# Patient Record
Sex: MALE | Race: WHITE | Employment: OTHER | URBAN - METROPOLITAN AREA
[De-identification: names, ages, dates, MRNs, and addresses within clinical notes are randomized per-mention and may not be internally consistent; named-entity substitution may affect disease eponyms.]

---

## 2019-09-25 ENCOUNTER — HOSPITAL ENCOUNTER (OUTPATIENT)
Dept: RADIATION THERAPY | Age: 69
Discharge: HOME | End: 2019-09-25

## 2022-01-01 ENCOUNTER — HOSPICE ADMISSION (OUTPATIENT)
Dept: HOSPICE | Facility: HOSPICE | Age: 72
End: 2022-01-01
Payer: MEDICARE

## 2022-01-01 ENCOUNTER — APPOINTMENT (OUTPATIENT)
Dept: GENERAL RADIOLOGY | Age: 72
DRG: 811 | End: 2022-01-01
Attending: EMERGENCY MEDICINE
Payer: OTHER MISCELLANEOUS

## 2022-01-01 ENCOUNTER — HOSPITAL ENCOUNTER (INPATIENT)
Age: 72
LOS: 3 days | Discharge: HOSPICE | DRG: 811 | End: 2022-01-06
Attending: EMERGENCY MEDICINE | Admitting: INTERNAL MEDICINE
Payer: OTHER MISCELLANEOUS

## 2022-01-01 ENCOUNTER — APPOINTMENT (OUTPATIENT)
Dept: VASCULAR SURGERY | Age: 72
DRG: 811 | End: 2022-01-01
Attending: EMERGENCY MEDICINE
Payer: OTHER MISCELLANEOUS

## 2022-01-01 ENCOUNTER — HOSPITAL ENCOUNTER (INPATIENT)
Age: 72
LOS: 1 days | DRG: 951 | End: 2022-01-06
Attending: FAMILY MEDICINE | Admitting: FAMILY MEDICINE
Payer: OTHER MISCELLANEOUS

## 2022-01-01 ENCOUNTER — APPOINTMENT (OUTPATIENT)
Dept: CT IMAGING | Age: 72
DRG: 811 | End: 2022-01-01
Attending: EMERGENCY MEDICINE
Payer: OTHER MISCELLANEOUS

## 2022-01-01 VITALS
TEMPERATURE: 98.2 F | HEART RATE: 102 BPM | HEIGHT: 71 IN | SYSTOLIC BLOOD PRESSURE: 83 MMHG | DIASTOLIC BLOOD PRESSURE: 53 MMHG | WEIGHT: 130 LBS | BODY MASS INDEX: 18.2 KG/M2 | RESPIRATION RATE: 46 BRPM | OXYGEN SATURATION: 82 %

## 2022-01-01 DIAGNOSIS — Z71.89 DNR (DO NOT RESUSCITATE) DISCUSSION: ICD-10-CM

## 2022-01-01 DIAGNOSIS — E87.6 HYPOKALEMIA: ICD-10-CM

## 2022-01-01 DIAGNOSIS — F05 DELIRIUM DUE TO GENERAL MEDICAL CONDITION: ICD-10-CM

## 2022-01-01 DIAGNOSIS — D64.9 ANEMIA, UNSPECIFIED TYPE: ICD-10-CM

## 2022-01-01 DIAGNOSIS — Z71.1 CONCERN ABOUT END OF LIFE: ICD-10-CM

## 2022-01-01 DIAGNOSIS — Z71.89 ADVANCED CARE PLANNING/COUNSELING DISCUSSION: ICD-10-CM

## 2022-01-01 DIAGNOSIS — R06.4 LABORED BREATHING: ICD-10-CM

## 2022-01-01 DIAGNOSIS — R62.7 FAILURE TO THRIVE IN ADULT: ICD-10-CM

## 2022-01-01 DIAGNOSIS — C61 PROSTATE CANCER METASTATIC TO BONE (HCC): ICD-10-CM

## 2022-01-01 DIAGNOSIS — Z51.5 END OF LIFE CARE: ICD-10-CM

## 2022-01-01 DIAGNOSIS — R41.0 DELIRIUM: Primary | ICD-10-CM

## 2022-01-01 DIAGNOSIS — R41.82 ALTERED MENTAL STATUS, UNSPECIFIED ALTERED MENTAL STATUS TYPE: ICD-10-CM

## 2022-01-01 DIAGNOSIS — C79.51 PROSTATE CANCER METASTATIC TO BONE (HCC): ICD-10-CM

## 2022-01-01 DIAGNOSIS — Z51.5 PALLIATIVE CARE ENCOUNTER: ICD-10-CM

## 2022-01-01 DIAGNOSIS — Z71.89 GOALS OF CARE, COUNSELING/DISCUSSION: ICD-10-CM

## 2022-01-01 LAB
ABO + RH BLD: NORMAL
ALBUMIN SERPL-MCNC: 2 G/DL (ref 3.5–5)
ALBUMIN/GLOB SERPL: 0.5 {RATIO} (ref 1.1–2.2)
ALP SERPL-CCNC: 735 U/L (ref 45–117)
ALT SERPL-CCNC: 18 U/L (ref 12–78)
AMPHET UR QL SCN: NEGATIVE
ANION GAP SERPL CALC-SCNC: 4 MMOL/L (ref 5–15)
ANION GAP SERPL CALC-SCNC: 7 MMOL/L (ref 5–15)
APPEARANCE UR: CLEAR
AST SERPL-CCNC: 48 U/L (ref 15–37)
BACTERIA URNS QL MICRO: NEGATIVE /HPF
BARBITURATES UR QL SCN: NEGATIVE
BASOPHILS # BLD: 0.1 K/UL (ref 0–0.1)
BASOPHILS NFR BLD: 2 % (ref 0–1)
BENZODIAZ UR QL: NEGATIVE
BILIRUB SERPL-MCNC: 0.7 MG/DL (ref 0.2–1)
BILIRUB UR QL CFM: NEGATIVE
BLD PROD TYP BPU: NORMAL
BLOOD GROUP ANTIBODIES SERPL: NORMAL
BPU ID: NORMAL
BUN SERPL-MCNC: 13 MG/DL (ref 6–20)
BUN SERPL-MCNC: 15 MG/DL (ref 6–20)
BUN/CREAT SERPL: 19 (ref 12–20)
BUN/CREAT SERPL: 19 (ref 12–20)
CALCIUM SERPL-MCNC: 7.3 MG/DL (ref 8.5–10.1)
CALCIUM SERPL-MCNC: 7.8 MG/DL (ref 8.5–10.1)
CANNABINOIDS UR QL SCN: NEGATIVE
CHLORIDE SERPL-SCNC: 102 MMOL/L (ref 97–108)
CHLORIDE SERPL-SCNC: 98 MMOL/L (ref 97–108)
CO2 SERPL-SCNC: 37 MMOL/L (ref 21–32)
CO2 SERPL-SCNC: 38 MMOL/L (ref 21–32)
COCAINE UR QL SCN: NEGATIVE
COLOR UR: ABNORMAL
COMMENT, HOLDF: NORMAL
CREAT SERPL-MCNC: 0.68 MG/DL (ref 0.7–1.3)
CREAT SERPL-MCNC: 0.81 MG/DL (ref 0.7–1.3)
CROSSMATCH RESULT,%XM: NORMAL
DIFFERENTIAL METHOD BLD: ABNORMAL
DRUG SCRN COMMENT,DRGCM: ABNORMAL
EOSINOPHIL # BLD: 0 K/UL (ref 0–0.4)
EOSINOPHIL NFR BLD: 0 % (ref 0–7)
EPITH CASTS URNS QL MICRO: ABNORMAL /LPF
ERYTHROCYTE [DISTWIDTH] IN BLOOD BY AUTOMATED COUNT: 22.6 % (ref 11.5–14.5)
ERYTHROCYTE [DISTWIDTH] IN BLOOD BY AUTOMATED COUNT: 25.9 % (ref 11.5–14.5)
FERRITIN SERPL-MCNC: 2471 NG/ML (ref 26–388)
FOLATE SERPL-MCNC: 4.5 NG/ML (ref 5–21)
GLOBULIN SER CALC-MCNC: 4.2 G/DL (ref 2–4)
GLUCOSE SERPL-MCNC: 110 MG/DL (ref 65–100)
GLUCOSE SERPL-MCNC: 99 MG/DL (ref 65–100)
GLUCOSE UR STRIP.AUTO-MCNC: NEGATIVE MG/DL
HCT VFR BLD AUTO: 16.2 % (ref 36.6–50.3)
HCT VFR BLD AUTO: 18.8 % (ref 36.6–50.3)
HCT VFR BLD AUTO: 19.2 % (ref 36.6–50.3)
HCT VFR BLD AUTO: 21.1 % (ref 36.6–50.3)
HCT VFR BLD AUTO: 25.5 % (ref 36.6–50.3)
HEMOCCULT STL QL: NEGATIVE
HGB BLD-MCNC: 4.6 G/DL (ref 12.1–17)
HGB BLD-MCNC: 5.6 G/DL (ref 12.1–17)
HGB BLD-MCNC: 5.7 G/DL (ref 12.1–17)
HGB BLD-MCNC: 6.2 G/DL (ref 12.1–17)
HGB BLD-MCNC: 7.8 G/DL (ref 12.1–17)
HGB UR QL STRIP: ABNORMAL
HISTORY CHECKED?,CKHIST: NORMAL
HYALINE CASTS URNS QL MICRO: ABNORMAL /LPF (ref 0–5)
IMM GRANULOCYTES # BLD AUTO: 0 K/UL
IMM GRANULOCYTES NFR BLD AUTO: 0 %
IRON SATN MFR SERPL: 97 % (ref 20–50)
IRON SERPL-MCNC: 106 UG/DL (ref 35–150)
KETONES UR QL STRIP.AUTO: NEGATIVE MG/DL
LACTATE SERPL-SCNC: 2 MMOL/L (ref 0.4–2)
LEUKOCYTE ESTERASE UR QL STRIP.AUTO: NEGATIVE
LIPASE SERPL-CCNC: 29 U/L (ref 73–393)
LYMPHOCYTES # BLD: 0.8 K/UL (ref 0.8–3.5)
LYMPHOCYTES NFR BLD: 15 % (ref 12–49)
MCH RBC QN AUTO: 26.3 PG (ref 26–34)
MCH RBC QN AUTO: 26.9 PG (ref 26–34)
MCHC RBC AUTO-ENTMCNC: 28.4 G/DL (ref 30–36.5)
MCHC RBC AUTO-ENTMCNC: 29.8 G/DL (ref 30–36.5)
MCV RBC AUTO: 90.4 FL (ref 80–99)
MCV RBC AUTO: 92.6 FL (ref 80–99)
METAMYELOCYTES NFR BLD MANUAL: 1 %
METHADONE UR QL: NEGATIVE
MONOCYTES # BLD: 0.3 K/UL (ref 0–1)
MONOCYTES NFR BLD: 5 % (ref 5–13)
MYELOCYTES NFR BLD MANUAL: 4 %
NEUTS BAND NFR BLD MANUAL: 2 % (ref 0–6)
NEUTS SEG # BLD: 3.7 K/UL (ref 1.8–8)
NEUTS SEG NFR BLD: 71 % (ref 32–75)
NITRITE UR QL STRIP.AUTO: NEGATIVE
NRBC # BLD: 0.09 K/UL (ref 0–0.01)
NRBC # BLD: 0.12 K/UL (ref 0–0.01)
NRBC BLD-RTO: 1.9 PER 100 WBC
NRBC BLD-RTO: 2.4 PER 100 WBC
OPIATES UR QL: POSITIVE
PATH REV BLD -IMP: NORMAL
PCP UR QL: NEGATIVE
PH UR STRIP: 7 [PH] (ref 5–8)
PLATELET # BLD AUTO: 227 K/UL (ref 150–400)
PLATELET # BLD AUTO: 261 K/UL (ref 150–400)
PMV BLD AUTO: 9.1 FL (ref 8.9–12.9)
PMV BLD AUTO: 9.4 FL (ref 8.9–12.9)
POTASSIUM SERPL-SCNC: 2.5 MMOL/L (ref 3.5–5.1)
POTASSIUM SERPL-SCNC: 2.7 MMOL/L (ref 3.5–5.1)
PROT SERPL-MCNC: 6.2 G/DL (ref 6.4–8.2)
PROT UR STRIP-MCNC: ABNORMAL MG/DL
RBC # BLD AUTO: 1.75 M/UL (ref 4.1–5.7)
RBC # BLD AUTO: 2.08 M/UL (ref 4.1–5.7)
RBC #/AREA URNS HPF: ABNORMAL /HPF (ref 0–5)
RBC MORPH BLD: ABNORMAL
RETICS # AUTO: 0.08 M/UL (ref 0.03–0.1)
RETICS/RBC NFR AUTO: 3.6 % (ref 0.7–2.1)
SAMPLES BEING HELD,HOLD: NORMAL
SODIUM SERPL-SCNC: 140 MMOL/L (ref 136–145)
SODIUM SERPL-SCNC: 146 MMOL/L (ref 136–145)
SP GR UR REFRACTOMETRY: 1.02 (ref 1–1.03)
SPECIMEN EXP DATE BLD: NORMAL
STATUS OF UNIT,%ST: NORMAL
TIBC SERPL-MCNC: 109 UG/DL (ref 250–450)
TROPONIN-HIGH SENSITIVITY: 20 NG/L (ref 0–76)
UNIT DIVISION, %UDIV: 0
UR CULT HOLD, URHOLD: NORMAL
UROBILINOGEN UR QL STRIP.AUTO: 1 EU/DL (ref 0.2–1)
VIT B12 SERPL-MCNC: 476 PG/ML (ref 193–986)
WBC # BLD AUTO: 4.7 K/UL (ref 4.1–11.1)
WBC # BLD AUTO: 5 K/UL (ref 4.1–11.1)
WBC URNS QL MICRO: ABNORMAL /HPF (ref 0–4)

## 2022-01-01 PROCEDURE — 83690 ASSAY OF LIPASE: CPT

## 2022-01-01 PROCEDURE — 83540 ASSAY OF IRON: CPT

## 2022-01-01 PROCEDURE — 36430 TRANSFUSION BLD/BLD COMPNT: CPT

## 2022-01-01 PROCEDURE — 87040 BLOOD CULTURE FOR BACTERIA: CPT

## 2022-01-01 PROCEDURE — 86900 BLOOD TYPING SEROLOGIC ABO: CPT

## 2022-01-01 PROCEDURE — 96365 THER/PROPH/DIAG IV INF INIT: CPT

## 2022-01-01 PROCEDURE — 3336500001 HSPC ELECTION

## 2022-01-01 PROCEDURE — P9016 RBC LEUKOCYTES REDUCED: HCPCS

## 2022-01-01 PROCEDURE — 74011250637 HC RX REV CODE- 250/637: Performed by: INTERNAL MEDICINE

## 2022-01-01 PROCEDURE — 94761 N-INVAS EAR/PLS OXIMETRY MLT: CPT

## 2022-01-01 PROCEDURE — 81001 URINALYSIS AUTO W/SCOPE: CPT

## 2022-01-01 PROCEDURE — 85045 AUTOMATED RETICULOCYTE COUNT: CPT

## 2022-01-01 PROCEDURE — 82746 ASSAY OF FOLIC ACID SERUM: CPT

## 2022-01-01 PROCEDURE — 74011250636 HC RX REV CODE- 250/636: Performed by: INTERNAL MEDICINE

## 2022-01-01 PROCEDURE — 85025 COMPLETE CBC W/AUTO DIFF WBC: CPT

## 2022-01-01 PROCEDURE — 74011250637 HC RX REV CODE- 250/637: Performed by: EMERGENCY MEDICINE

## 2022-01-01 PROCEDURE — 84484 ASSAY OF TROPONIN QUANT: CPT

## 2022-01-01 PROCEDURE — 82272 OCCULT BLD FECES 1-3 TESTS: CPT

## 2022-01-01 PROCEDURE — 74011250636 HC RX REV CODE- 250/636: Performed by: NURSE PRACTITIONER

## 2022-01-01 PROCEDURE — 36415 COLL VENOUS BLD VENIPUNCTURE: CPT

## 2022-01-01 PROCEDURE — 85027 COMPLETE CBC AUTOMATED: CPT

## 2022-01-01 PROCEDURE — 83605 ASSAY OF LACTIC ACID: CPT

## 2022-01-01 PROCEDURE — 93970 EXTREMITY STUDY: CPT

## 2022-01-01 PROCEDURE — 86923 COMPATIBILITY TEST ELECTRIC: CPT

## 2022-01-01 PROCEDURE — 99223 1ST HOSP IP/OBS HIGH 75: CPT | Performed by: NURSE PRACTITIONER

## 2022-01-01 PROCEDURE — 99233 SBSQ HOSP IP/OBS HIGH 50: CPT | Performed by: INTERNAL MEDICINE

## 2022-01-01 PROCEDURE — 77010033678 HC OXYGEN DAILY

## 2022-01-01 PROCEDURE — 0656 HSPC GENERAL INPATIENT

## 2022-01-01 PROCEDURE — 85018 HEMOGLOBIN: CPT

## 2022-01-01 PROCEDURE — 80048 BASIC METABOLIC PNL TOTAL CA: CPT

## 2022-01-01 PROCEDURE — 82728 ASSAY OF FERRITIN: CPT

## 2022-01-01 PROCEDURE — 82607 VITAMIN B-12: CPT

## 2022-01-01 PROCEDURE — 65270000029 HC RM PRIVATE

## 2022-01-01 PROCEDURE — 74011000250 HC RX REV CODE- 250: Performed by: INTERNAL MEDICINE

## 2022-01-01 PROCEDURE — 80307 DRUG TEST PRSMV CHEM ANLYZR: CPT

## 2022-01-01 PROCEDURE — 71045 X-RAY EXAM CHEST 1 VIEW: CPT

## 2022-01-01 PROCEDURE — 65660000000 HC RM CCU STEPDOWN

## 2022-01-01 PROCEDURE — 70450 CT HEAD/BRAIN W/O DYE: CPT

## 2022-01-01 PROCEDURE — 80053 COMPREHEN METABOLIC PANEL: CPT

## 2022-01-01 PROCEDURE — 74011250636 HC RX REV CODE- 250/636: Performed by: EMERGENCY MEDICINE

## 2022-01-01 PROCEDURE — 99285 EMERGENCY DEPT VISIT HI MDM: CPT

## 2022-01-01 PROCEDURE — 74011000250 HC RX REV CODE- 250: Performed by: NURSE PRACTITIONER

## 2022-01-01 RX ORDER — GLYCOPYRROLATE 0.2 MG/ML
0.2 INJECTION INTRAMUSCULAR; INTRAVENOUS
Status: DISCONTINUED | OUTPATIENT
Start: 2022-01-01 | End: 2022-01-01 | Stop reason: HOSPADM

## 2022-01-01 RX ORDER — KETOROLAC TROMETHAMINE 30 MG/ML
30 INJECTION, SOLUTION INTRAMUSCULAR; INTRAVENOUS
Status: DISCONTINUED | OUTPATIENT
Start: 2022-01-01 | End: 2022-01-01 | Stop reason: HOSPADM

## 2022-01-01 RX ORDER — FACIAL-BODY WIPES
10 EACH TOPICAL DAILY PRN
Status: DISCONTINUED | OUTPATIENT
Start: 2022-01-01 | End: 2022-01-01 | Stop reason: HOSPADM

## 2022-01-01 RX ORDER — ONDANSETRON 4 MG/1
4 TABLET, ORALLY DISINTEGRATING ORAL
Status: DISCONTINUED | OUTPATIENT
Start: 2022-01-01 | End: 2022-01-01 | Stop reason: HOSPADM

## 2022-01-01 RX ORDER — HYDROMORPHONE HYDROCHLORIDE 2 MG/ML
3 INJECTION, SOLUTION INTRAMUSCULAR; INTRAVENOUS; SUBCUTANEOUS
Status: DISCONTINUED | OUTPATIENT
Start: 2022-01-01 | End: 2022-01-01 | Stop reason: HOSPADM

## 2022-01-01 RX ORDER — MORPHINE SULFATE 4 MG/ML
4 INJECTION INTRAVENOUS
Status: DISCONTINUED | OUTPATIENT
Start: 2022-01-01 | End: 2022-01-01

## 2022-01-01 RX ORDER — SODIUM CHLORIDE AND POTASSIUM CHLORIDE .9; .15 G/100ML; G/100ML
SOLUTION INTRAVENOUS ONCE
Status: COMPLETED | OUTPATIENT
Start: 2022-01-01 | End: 2022-01-01

## 2022-01-01 RX ORDER — TAMSULOSIN HYDROCHLORIDE 0.4 MG/1
0.4 CAPSULE ORAL DAILY
COMMUNITY

## 2022-01-01 RX ORDER — FOLIC ACID 1 MG/1
1 TABLET ORAL DAILY
Status: DISCONTINUED | OUTPATIENT
Start: 2022-01-01 | End: 2022-01-01

## 2022-01-01 RX ORDER — FUROSEMIDE 20 MG/1
20 TABLET ORAL
COMMUNITY

## 2022-01-01 RX ORDER — SODIUM CHLORIDE 9 MG/ML
250 INJECTION, SOLUTION INTRAVENOUS AS NEEDED
Status: DISPENSED | OUTPATIENT
Start: 2022-01-01 | End: 2022-01-01

## 2022-01-01 RX ORDER — OXYCODONE HYDROCHLORIDE 15 MG/1
30 TABLET, FILM COATED, EXTENDED RELEASE ORAL EVERY 12 HOURS
COMMUNITY

## 2022-01-01 RX ORDER — MIRTAZAPINE 7.5 MG/1
7.5 TABLET, FILM COATED ORAL
COMMUNITY

## 2022-01-01 RX ORDER — CALCIUM CARBONATE/VITAMIN D3 500 MG-10
1 TABLET ORAL 2 TIMES DAILY
COMMUNITY

## 2022-01-01 RX ORDER — LORAZEPAM 2 MG/ML
2 INJECTION INTRAMUSCULAR
Status: DISCONTINUED | OUTPATIENT
Start: 2022-01-01 | End: 2022-01-01 | Stop reason: HOSPADM

## 2022-01-01 RX ORDER — SODIUM CHLORIDE 9 MG/ML
250 INJECTION, SOLUTION INTRAVENOUS AS NEEDED
Status: DISCONTINUED | OUTPATIENT
Start: 2022-01-01 | End: 2022-01-01

## 2022-01-01 RX ORDER — LORAZEPAM 2 MG/ML
1 INJECTION INTRAMUSCULAR
Status: DISCONTINUED | OUTPATIENT
Start: 2022-01-01 | End: 2022-01-01

## 2022-01-01 RX ORDER — OXYCODONE HYDROCHLORIDE 5 MG/1
20 TABLET ORAL
Status: DISCONTINUED | OUTPATIENT
Start: 2022-01-01 | End: 2022-01-01

## 2022-01-01 RX ORDER — SODIUM CHLORIDE 0.9 % (FLUSH) 0.9 %
5-40 SYRINGE (ML) INJECTION EVERY 8 HOURS
Status: DISCONTINUED | OUTPATIENT
Start: 2022-01-01 | End: 2022-01-01

## 2022-01-01 RX ORDER — ACETAMINOPHEN 325 MG/1
650 TABLET ORAL
Status: DISCONTINUED | OUTPATIENT
Start: 2022-01-01 | End: 2022-01-01 | Stop reason: HOSPADM

## 2022-01-01 RX ORDER — POTASSIUM CHLORIDE 750 MG/1
40 TABLET, FILM COATED, EXTENDED RELEASE ORAL
Status: COMPLETED | OUTPATIENT
Start: 2022-01-01 | End: 2022-01-01

## 2022-01-01 RX ORDER — UREA 10 %
100 LOTION (ML) TOPICAL DAILY
COMMUNITY

## 2022-01-01 RX ORDER — HYDROMORPHONE HYDROCHLORIDE 2 MG/ML
2 INJECTION, SOLUTION INTRAMUSCULAR; INTRAVENOUS; SUBCUTANEOUS
Status: DISCONTINUED | OUTPATIENT
Start: 2022-01-01 | End: 2022-01-01

## 2022-01-01 RX ORDER — DULOXETIN HYDROCHLORIDE 30 MG/1
30 CAPSULE, DELAYED RELEASE ORAL DAILY
COMMUNITY

## 2022-01-01 RX ORDER — HYDROMORPHONE HYDROCHLORIDE 2 MG/ML
2 INJECTION, SOLUTION INTRAMUSCULAR; INTRAVENOUS; SUBCUTANEOUS EVERY 4 HOURS
Status: DISCONTINUED | OUTPATIENT
Start: 2022-01-01 | End: 2022-01-01

## 2022-01-01 RX ORDER — NALOXONE HYDROCHLORIDE 4 MG/.1ML
1 SPRAY NASAL
COMMUNITY

## 2022-01-01 RX ORDER — UMECLIDINIUM 62.5 UG/1
1 AEROSOL, POWDER ORAL DAILY
COMMUNITY

## 2022-01-01 RX ORDER — LANOLIN ALCOHOL/MO/W.PET/CERES
325 CREAM (GRAM) TOPICAL
COMMUNITY

## 2022-01-01 RX ORDER — ONDANSETRON 4 MG/1
4 TABLET, ORALLY DISINTEGRATING ORAL
COMMUNITY

## 2022-01-01 RX ORDER — LIDOCAINE 50 MG/G
1 PATCH TOPICAL EVERY 24 HOURS
COMMUNITY

## 2022-01-01 RX ORDER — ONDANSETRON 2 MG/ML
4 INJECTION INTRAMUSCULAR; INTRAVENOUS
Status: DISCONTINUED | OUTPATIENT
Start: 2022-01-01 | End: 2022-01-01 | Stop reason: HOSPADM

## 2022-01-01 RX ORDER — OXYCODONE HYDROCHLORIDE 10 MG/1
20 TABLET ORAL
COMMUNITY

## 2022-01-01 RX ORDER — ALBUTEROL SULFATE 90 UG/1
2 AEROSOL, METERED RESPIRATORY (INHALATION)
COMMUNITY

## 2022-01-01 RX ORDER — OXYCODONE HCL 20 MG/1
20 TABLET, FILM COATED, EXTENDED RELEASE ORAL EVERY 12 HOURS
Status: DISCONTINUED | OUTPATIENT
Start: 2022-01-01 | End: 2022-01-01

## 2022-01-01 RX ORDER — DOCUSATE SODIUM 100 MG/1
200 CAPSULE, LIQUID FILLED ORAL 2 TIMES DAILY
COMMUNITY

## 2022-01-01 RX ORDER — LORAZEPAM 2 MG/ML
1 INJECTION INTRAMUSCULAR EVERY 4 HOURS
Status: DISCONTINUED | OUTPATIENT
Start: 2022-01-01 | End: 2022-01-01

## 2022-01-01 RX ORDER — LEUPROLIDE ACETATE 45 MG
45 KIT INTRAMUSCULAR
COMMUNITY

## 2022-01-01 RX ORDER — POLYETHYLENE GLYCOL 3350 17 G/17G
17 POWDER, FOR SOLUTION ORAL DAILY PRN
Status: DISCONTINUED | OUTPATIENT
Start: 2022-01-01 | End: 2022-01-01

## 2022-01-01 RX ORDER — SENNOSIDES 8.6 MG/1
2 TABLET ORAL
COMMUNITY

## 2022-01-01 RX ORDER — ACETAMINOPHEN 650 MG/1
650 SUPPOSITORY RECTAL
Status: DISCONTINUED | OUTPATIENT
Start: 2022-01-01 | End: 2022-01-01 | Stop reason: HOSPADM

## 2022-01-01 RX ORDER — SODIUM CHLORIDE 0.9 % (FLUSH) 0.9 %
5-40 SYRINGE (ML) INJECTION AS NEEDED
Status: DISCONTINUED | OUTPATIENT
Start: 2022-01-01 | End: 2022-01-01 | Stop reason: HOSPADM

## 2022-01-01 RX ORDER — OXYBUTYNIN CHLORIDE 10 MG/1
10 TABLET, EXTENDED RELEASE ORAL DAILY
COMMUNITY

## 2022-01-01 RX ORDER — MELATONIN
1000 DAILY
COMMUNITY

## 2022-01-01 RX ADMIN — MORPHINE SULFATE 4 MG: 4 INJECTION, SOLUTION INTRAMUSCULAR; INTRAVENOUS at 21:44

## 2022-01-01 RX ADMIN — MORPHINE SULFATE 4 MG: 4 INJECTION, SOLUTION INTRAMUSCULAR; INTRAVENOUS at 22:42

## 2022-01-01 RX ADMIN — LORAZEPAM 1 MG: 2 INJECTION INTRAMUSCULAR; INTRAVENOUS at 23:12

## 2022-01-01 RX ADMIN — HYDROMORPHONE HYDROCHLORIDE 2 MG: 2 INJECTION, SOLUTION INTRAMUSCULAR; INTRAVENOUS; SUBCUTANEOUS at 11:42

## 2022-01-01 RX ADMIN — POTASSIUM CHLORIDE 40 MEQ: 750 TABLET, FILM COATED, EXTENDED RELEASE ORAL at 13:39

## 2022-01-01 RX ADMIN — LORAZEPAM 1 MG: 2 INJECTION INTRAMUSCULAR; INTRAVENOUS at 00:02

## 2022-01-01 RX ADMIN — LORAZEPAM 2 MG: 2 INJECTION INTRAMUSCULAR; INTRAVENOUS at 11:03

## 2022-01-01 RX ADMIN — POTASSIUM CHLORIDE 40 MEQ: 750 TABLET, FILM COATED, EXTENDED RELEASE ORAL at 09:54

## 2022-01-01 RX ADMIN — FOLIC ACID 1 MG: 1 TABLET ORAL at 08:12

## 2022-01-01 RX ADMIN — LORAZEPAM 1 MG: 2 INJECTION INTRAMUSCULAR; INTRAVENOUS at 02:29

## 2022-01-01 RX ADMIN — MORPHINE SULFATE 4 MG: 4 INJECTION, SOLUTION INTRAMUSCULAR; INTRAVENOUS at 04:03

## 2022-01-01 RX ADMIN — POTASSIUM CHLORIDE 40 MEQ: 750 TABLET, FILM COATED, EXTENDED RELEASE ORAL at 08:12

## 2022-01-01 RX ADMIN — MORPHINE SULFATE 4 MG: 4 INJECTION, SOLUTION INTRAMUSCULAR; INTRAVENOUS at 10:07

## 2022-01-01 RX ADMIN — HYDROMORPHONE HYDROCHLORIDE 2 MG: 2 INJECTION INTRAMUSCULAR; INTRAVENOUS; SUBCUTANEOUS at 02:33

## 2022-01-01 RX ADMIN — LORAZEPAM 1 MG: 2 INJECTION INTRAMUSCULAR; INTRAVENOUS at 05:20

## 2022-01-01 RX ADMIN — HYDROMORPHONE HYDROCHLORIDE 2 MG: 2 INJECTION INTRAMUSCULAR; INTRAVENOUS; SUBCUTANEOUS at 23:11

## 2022-01-01 RX ADMIN — LORAZEPAM 1 MG: 2 INJECTION INTRAMUSCULAR; INTRAVENOUS at 15:21

## 2022-01-01 RX ADMIN — GLYCOPYRROLATE 0.2 MG: 0.2 INJECTION, SOLUTION INTRAMUSCULAR; INTRAVENOUS at 19:01

## 2022-01-01 RX ADMIN — MORPHINE SULFATE 4 MG: 4 INJECTION, SOLUTION INTRAMUSCULAR; INTRAVENOUS at 02:48

## 2022-01-01 RX ADMIN — POTASSIUM CHLORIDE AND SODIUM CHLORIDE 500 ML: 900; 150 INJECTION, SOLUTION INTRAVENOUS at 13:42

## 2022-01-01 RX ADMIN — HYDROMORPHONE HYDROCHLORIDE 2 MG: 2 INJECTION, SOLUTION INTRAMUSCULAR; INTRAVENOUS; SUBCUTANEOUS at 15:21

## 2022-01-01 RX ADMIN — HYDROMORPHONE HYDROCHLORIDE 2 MG: 2 INJECTION INTRAMUSCULAR; INTRAVENOUS; SUBCUTANEOUS at 05:20

## 2022-01-01 RX ADMIN — LORAZEPAM 1 MG: 2 INJECTION INTRAMUSCULAR; INTRAVENOUS at 22:18

## 2022-01-01 RX ADMIN — LORAZEPAM 1 MG: 2 INJECTION INTRAMUSCULAR; INTRAVENOUS at 11:42

## 2022-01-01 RX ADMIN — HYDROMORPHONE HYDROCHLORIDE 3 MG: 2 INJECTION, SOLUTION INTRAMUSCULAR; INTRAVENOUS; SUBCUTANEOUS at 11:03

## 2022-01-01 RX ADMIN — HYDROMORPHONE HYDROCHLORIDE 2 MG: 2 INJECTION, SOLUTION INTRAMUSCULAR; INTRAVENOUS; SUBCUTANEOUS at 13:31

## 2022-01-01 RX ADMIN — MORPHINE SULFATE 4 MG: 4 INJECTION, SOLUTION INTRAMUSCULAR; INTRAVENOUS at 01:01

## 2022-01-01 RX ADMIN — MORPHINE SULFATE 4 MG: 4 INJECTION, SOLUTION INTRAMUSCULAR; INTRAVENOUS at 23:33

## 2022-01-01 RX ADMIN — SODIUM CHLORIDE, PRESERVATIVE FREE 10 ML: 5 INJECTION INTRAVENOUS at 13:34

## 2022-01-01 RX ADMIN — LORAZEPAM 1 MG: 2 INJECTION INTRAMUSCULAR; INTRAVENOUS at 08:37

## 2022-01-01 RX ADMIN — LORAZEPAM 1 MG: 2 INJECTION INTRAMUSCULAR; INTRAVENOUS at 05:59

## 2022-01-01 RX ADMIN — ACETAMINOPHEN 650 MG: 325 TABLET ORAL at 20:22

## 2022-01-01 RX ADMIN — HYDROMORPHONE HYDROCHLORIDE 2 MG: 2 INJECTION, SOLUTION INTRAMUSCULAR; INTRAVENOUS; SUBCUTANEOUS at 17:37

## 2022-01-01 RX ADMIN — HYDROMORPHONE HYDROCHLORIDE 2 MG: 2 INJECTION INTRAMUSCULAR; INTRAVENOUS; SUBCUTANEOUS at 08:37

## 2022-01-01 RX ADMIN — HYDROMORPHONE HYDROCHLORIDE 2 MG: 2 INJECTION, SOLUTION INTRAMUSCULAR; INTRAVENOUS; SUBCUTANEOUS at 19:50

## 2022-01-01 RX ADMIN — MORPHINE SULFATE 4 MG: 4 INJECTION, SOLUTION INTRAMUSCULAR; INTRAVENOUS at 05:47

## 2022-01-01 RX ADMIN — MORPHINE SULFATE 4 MG: 4 INJECTION, SOLUTION INTRAMUSCULAR; INTRAVENOUS at 06:42

## 2022-01-01 RX ADMIN — MORPHINE SULFATE 4 MG: 4 INJECTION, SOLUTION INTRAMUSCULAR; INTRAVENOUS at 19:01

## 2022-01-01 RX ADMIN — LORAZEPAM 1 MG: 2 INJECTION INTRAMUSCULAR; INTRAVENOUS at 19:50

## 2022-01-01 RX ADMIN — LORAZEPAM 1 MG: 2 INJECTION INTRAMUSCULAR; INTRAVENOUS at 02:33

## 2022-01-01 RX ADMIN — MORPHINE SULFATE 4 MG: 4 INJECTION, SOLUTION INTRAMUSCULAR; INTRAVENOUS at 02:09

## 2022-01-03 PROBLEM — D64.9 SEVERE ANEMIA: Status: ACTIVE | Noted: 2022-01-01

## 2022-01-03 PROBLEM — E87.6 HYPOKALEMIA: Status: ACTIVE | Noted: 2022-01-01

## 2022-01-03 NOTE — PROGRESS NOTES
[facility]: MED RECONCILIATION    Information obtained from: Patient with AMS so [facility] (11/30/2021 in EPIC); no Rx query on file    Significant PMH/Disease States: No past medical history on file. Chief Complaint for this Admission:   Chief Complaint   Patient presents with    Altered mental status     Allergies: Valium [diazepam]    Prior to Admission Medications:     Medication Documentation Review Audit       Reviewed by Lein Murphy (Pharmacist) on 01/03/22 at 1432      Medication Sig Documenting Provider Last Dose Status Taking? albuterol (PROVENTIL HFA, VENTOLIN HFA, PROAIR HFA) 90 mcg/actuation inhaler Take 2 Puffs by inhalation every six (6) hours as needed for Wheezing. Provider, Historical  Active Yes   Calcium-Cholecalciferol, D3, 500 mg-10 mcg (400 unit) tab Take 1 Tablet by mouth two (2) times a day. Provider, Historical  Active Yes   cholecalciferol (VITAMIN D3) (1000 Units /25 mcg) tablet Take 1,000 Units by mouth daily. Provider, Historical  Active Yes   cyanocobalamin (VITAMIN B12) 100 mcg tablet Take 100 mcg by mouth daily. Provider, Historical  Active Yes   docusate sodium (COLACE) 100 mg capsule Take 200 mg by mouth two (2) times a day. Provider, Historical  Active Yes   DULoxetine (CYMBALTA) 30 mg capsule Take 30 mg by mouth daily. Provider, Historical  Active Yes   enzalutamide (XTANDI) 40 mg capsule Take 160 mg by mouth daily. Provider, Historical  Active Yes   ferrous sulfate 325 mg (65 mg iron) tablet Take  by mouth Daily (before breakfast). Provider, Historical  Active Yes   furosemide (LASIX) 20 mg tablet Take 20 mg by mouth daily as needed (edema/swelling). Provider, Historical  Active Yes   leuprolide depot (Lupron Depot, 6 Month,) 45 mg injection 45 mg by IntraMUSCular route every 6 months.  Provider, Historical  Active Yes           Med Note Marcelina Claude Rudy 3, 2022  2:27 PM) Salima Bonner from [facility] ([phone]   lidocaine (LIDODERM) 5 % 1 Patch by TransDERmal route every twenty-four (24) hours. Apply patch to the affected area for 12 hours a day and remove for 12 hours a day. Provider, Historical  Active Yes   mirtazapine (REMERON) 7.5 mg tablet Take 7.5 mg by mouth nightly. Provider, Historical  Active Yes   naloxone (Narcan) 4 mg/actuation nasal spray 1 [place] by IntraNASal route once as needed for Overdose. Use 1 spray intranasally, then discard. Repeat with new spray every 2 min as needed for opioid overdose symptoms, alternating nostrils. Provider, Historical  Active Yes   ondansetron (ZOFRAN ODT) 4 mg disintegrating tablet Take 4 mg by mouth every eight (8) hours as needed for Nausea or Vomiting. Provider, Historical  Active Yes   oxybutynin chloride XL (DITROPAN XL) 10 mg CR tablet Take 10 mg by mouth daily. Provider, Historical  Active Yes   oxyCODONE ER (OxyCONTIN) 15 mg ER tablet Take 30 mg by mouth every twelve (12) hours. Provider, Historical  Active Yes   oxyCODONE IR (ROXICODONE) 10 mg tab immediate release tablet Take 20 mg by mouth every three (3) hours as needed for Pain (breakthrough pain). Provider, Historical  Active Yes   senna (Senna) 8.6 mg tablet Take 2 Tablets by mouth nightly as needed for Constipation. Provider, Historical  Active Yes   tamsulosin (FLOMAX) 0.4 mg capsule Take 0.4 mg by mouth daily. Provider, Historical  Active Yes   umeclidinium (Incruse Ellipta) 62.5 mcg/actuation inhaler Take 1 Puff by inhalation daily. Provider, Historical  Active Yes                Thank you,  Prosper COSTELLO, [facility]

## 2022-01-03 NOTE — ED PROVIDER NOTES
Aram Cummins is a 72 yo M with h/o prostate cancer who is brought to the ED for altered mental status. EMS reports patient live is private home with cousins. Patient no longer recognizes family members. Has not bathed in several days. Patient states family members. Patient also compalining of swelling in his legs           No past medical history on file. No past surgical history on file. No family history on file. Social History     Socioeconomic History    Marital status: Not on file     Spouse name: Not on file    Number of children: Not on file    Years of education: Not on file    Highest education level: Not on file   Occupational History    Not on file   Tobacco Use    Smoking status: Not on file    Smokeless tobacco: Not on file   Substance and Sexual Activity    Alcohol use: Not on file    Drug use: Not on file    Sexual activity: Not on file   Other Topics Concern    Not on file   Social History Narrative    Not on file     Social Determinants of Health     Financial Resource Strain:     Difficulty of Paying Living Expenses: Not on file   Food Insecurity:     Worried About Running Out of Food in the Last Year: Not on file    Derek of Food in the Last Year: Not on file   Transportation Needs:     Lack of Transportation (Medical): Not on file    Lack of Transportation (Non-Medical):  Not on file   Physical Activity:     Days of Exercise per Week: Not on file    Minutes of Exercise per Session: Not on file   Stress:     Feeling of Stress : Not on file   Social Connections:     Frequency of Communication with Friends and Family: Not on file    Frequency of Social Gatherings with Friends and Family: Not on file    Attends Jew Services: Not on file    Active Member of Clubs or Organizations: Not on file    Attends Club or Organization Meetings: Not on file    Marital Status: Not on file   Intimate Partner Violence:     Fear of Current or Ex-Partner: Not on file  Emotionally Abused: Not on file    Physically Abused: Not on file    Sexually Abused: Not on file   Housing Stability:     Unable to Pay for Housing in the Last Year: Not on file    Number of Places Lived in the Last Year: Not on file    Unstable Housing in the Last Year: Not on file         ALLERGIES: Patient has no allergy information on record. Review of Systems   Unable to perform ROS: Mental status change   Cardiovascular: Positive for leg swelling. Vitals:    01/03/22 1525 01/03/22 1540 01/03/22 1610 01/03/22 1702   BP: (!) 141/61 (!) 146/73 (!) 121/57 135/64   Pulse: 83 79 84 82   Resp: 20 18 18 18   Temp: 98.5 °F (36.9 °C) 98.4 °F (36.9 °C) 98.2 °F (36.8 °C) 98.6 °F (37 °C)   SpO2: 99% 99% 99% 99%   Weight:       Height:                Physical Exam  Vitals and nursing note reviewed. Constitutional:       General: He is not in acute distress. Appearance: He is well-developed. He is cachectic. Comments: Brown smears on feet and body   HENT:      Head: Normocephalic and atraumatic. Eyes:      Conjunctiva/sclera: Conjunctivae normal.   Neck:      Trachea: Phonation normal.   Cardiovascular:      Rate and Rhythm: Normal rate. Pulmonary:      Effort: Pulmonary effort is normal. No respiratory distress. Breath sounds: No wheezing. Abdominal:      General: There is no distension. Tenderness: There is no abdominal tenderness. Musculoskeletal:         General: No tenderness. Normal range of motion. Cervical back: Normal range of motion. Right lower leg: Edema present. Left lower leg: Edema present. Skin:     General: Skin is warm and dry. Neurological:      Mental Status: He is alert. He is not disoriented. Motor: No abnormal muscle tone. MDM       Perfect Serve Consult for Admission  1:28 PM    ED Room Number: ERA/A  Patient Name and age:  Rashi Harper 73 y.o.  male  Working Diagnosis:   1. Delirium    2. Hypokalemia    3.  Anemia, unspecified type    4. Failure to thrive in adult        COVID-19 Suspicion:  no  Sepsis present:  no  Reassessment needed: N/A  Code Status:  Full Code  Readmission: no  Isolation Requirements:  no  Recommended Level of Care:  telemetry  Department:[facility] Adult ED - 21   Other:  Patient with prostate CA, followed at [facility]. Lives a private residence EMS reports caregivers note patient increasingly confused. Does not recognize them.  K 2.5, HGB 4.6. Awaiting urine specimen. 1 unit PRBCs ordered as well as PO and IV potassium.     Procedures

## 2022-01-03 NOTE — H&P
Pieter Snyder [facility]  [address] [facility], [place], [address] [place] [address]  [phone]    Admission History and Physical      NAME:  Gerhardt Eva   :   1950   MRN:  [id number]     PCP:  No primary care provider on file. Date/Time of service:  1/3/2022  2:01 PM        Subjective:     CHIEF COMPLAINT: \"I think someone is poisoning me\"    HISTORY OF PRESENT ILLNESS:     Mr. Mee Murphy is a 73 y.o. male who is admitted with concern for \"someone is trying to poison me. \" States over the past 5-6 days, he has been confused and he himself thought he was developing dementia. 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 Once he passed, his nephew said patient could stay with him due to no heat at patient's house. There is also a woman, Jeannette Hedrick, who lives there too and helps takes care of him, although he states they do not help him. He follows at [facility] for geriatric medicine and prostate cancer with bony mets. Apparently he has been evaluated by palliative care in the past as well per geriatric medicine note 2021 and he was going to consider hospice. On my evaluation, he states he is no longer interested in hospice at this time. In ER, he was noted to have severe anemia with hgb 4.6 and hypokalemia with K 2.5.     Allergies   Allergen Reactions    Valium [Diazepam] Anxiety       Prior to Admission medications    Not on File       Past Medical History: prostate cancer with bone mets, lung adenocarcinoma, COPD, chronic pain     Past Surgical History: bilateral lobectomy, abdominal wall hernia repair    Social History     Tobacco Use    Smoking status: Not on file    Smokeless tobacco: Not on file   Substance Use Topics    Alcohol use: Not on file        Family History: heart disease in mother and father     Review of Systems:  (bold if positive, if negative)    Gen:  Eyes:  ENT:  CVS:  Pulm:  GI:  :  MS:  Skin:  Psych:  Endo:  Hem:  Renal: Neuro:          Objective:      VITALS:    Vital signs reviewed; most recent are:    Visit Vitals  /66   Pulse 84   Temp 97.8 °F (36.6 °C)   Resp 18   Ht 5' 11\" (1.803 m)   Wt 59 kg (130 lb)   SpO2 94%   BMI 18.13 kg/m²     SpO2 Readings from Last 6 Encounters:   01/03/22 94%        No intake or output data in the 24 hours ending 01/03/22 1401     Exam:     Physical Exam:    Gen:  Well-developed, cachectic, in no acute distress  HEENT:  Pink conjunctivae, PERRL, hearing intact to voice, moist mucous membranes  Neck:  Supple, without masses, thyroid non-tender  Resp:  No accessory muscle use, clear breath sounds without wheezes rales or rhonchi  Card:  No murmurs, normal S1, S2 without thrills, bruits; +b/l LE edema  Abd:  Soft, non-tender, non-distended, normoactive bowel sounds are present, no palpable organomegaly and no detectable hernias  Lymph:  No cervical adenopathy  Musc:  No cyanosis or clubbing  Skin:  No rashes or ulcers, skin turgor is good; dirt on R foot, L axilla  Neuro:  Nonfocal, moves extremities, follows commands  Psych:  Alert with good insight. Oriented to person, place, and time    Labs:    Recent Labs     01/03/22  1220   WBC 5.0   HGB 4.6*   HCT 16.2*        Recent Labs     01/03/22  1220      K 2.5*   CL 98   CO2 38*   *   BUN 15   CREA 0.81   CA 7.8*   ALB 2.0*   TBILI 0.7   ALT 18          Assessment/Plan:       1. Anemia - for 1 unit in ER, recheck h/h, transfuse prn hgb<7; stool occult neg, check iron panel, b12, folate  2. Hypokalemia - replace  3. Acute encephalopathy - resolved, check uds   4. Metastatic prostate cancer - mets to bone; follows urology, s/p radiation, c/s palliative care here as he was considering hospice per geriatric medicine note 11/30  5. History of lung adenocarcinoma - s/p b/l lobectomy  6. Chronic pain - chronic narcotic use  7. Anxiety  8. Urinary retention  9.  Underweight - BMI 18.13; c/s nutrition; on remeron at bedtime prn for appetite  10. COPD without acute exacerbation - cont inh regimen  11. VTE prophylaxis - hold pharm anticoag se to anemia w/u  12. Dispo - admit to telemetry, c/s palliative care    Per chart, note 11/30/2021 from [address] [place] [address], completed dDNR but he is adamant to me that he wishes to be kept alive including chest compressions and intubation, if necessary.      Attending Physician: Shreya Finley, DO

## 2022-01-04 NOTE — CONSULTS
Comprehensive Nutrition Assessment    Type and Reason for Visit: Initial,Consult    Nutrition Recommendations/Plan:   1. Continue Easy to [employer]   2. Provide Ensure Enlive TID (1050 kcal, 132 g carbs, 60 g P) to aid in meeting kcal/protein needs. 3. Please obtain measured weight. Nutrition Assessment:    Pt is a 73year old male admitted with Severe anemia; Hypokalemia. He  has no past medical history on file. RD consulted for poor intake/appetite x 5 days. No PO intake documented. Patient states great appetite, consumed 50% of meal at time of RD visit. States #, but stated weight from 1/3/2022 is 130#. Patient endorses recent weight loss, isn't sure how much, but states it is due to 'not being fed where [he] was'. Believes he began losing weight six weeks ago. Patient was unable to explain further If stated weights correct, patient has lost 55# (29%) x 6 weeks- unlikely. Voiced acceptance of Ensure Enlive TID. NKFA. No N/V/D at this time. Wears top dentures. Wt Readings from Last 10 Encounters:   01/03/22 59 kg (130 lb)     Malnutrition Assessment:  Malnutrition Status:  Severe malnutrition    Context:  Acute illness     Findings of the 6 clinical characteristics of malnutrition:   Energy Intake:  No significant decrease in energy intake  Weight Loss:  Unable to assess     Body Fat Loss:  7 - Moderate body fat loss, Orbital,Triceps,Fat overlying ribs,Buccal region   Muscle Mass Loss:  7 - Moderate muscle mass loss, Clavicles (pectoralis & deltoids),Temples (temporalis)  Fluid Accumulation:  No significant fluid accumulation,     Strength:  Not performed     Estimated Daily Nutrient Needs:  Energy (kcal): 1816 (MSJ x 1.1 x 1.2); Weight Used for Energy Requirements: Current  Protein (g): 59-71 (1.0-1.2);  Weight Used for Protein Requirements: Current  Fluid (ml/day): 1816; Method Used for Fluid Requirements: 1 ml/kcal    Nutrition Related Findings:    ABD: Fair appetite and active bowel sounds 1/4  Last BM: 1/3  Edema: none noted 1/4    Nutr. Labs:  Lab Results   Component Value Date/Time    GFR est AA >60 01/04/2022 01:28 AM    GFR est non-AA >60 01/04/2022 01:28 AM    Creatinine 0.68 (L) 01/04/2022 01:28 AM    BUN 13 01/04/2022 01:28 AM    Sodium 146 (H) 01/04/2022 01:28 AM    Potassium 2.7 (LL) 01/04/2022 01:28 AM    Chloride 102 01/04/2022 01:28 AM    CO2 37 (H) 01/04/2022 01:28 AM     Lab Results   Component Value Date/Time    Glucose 99 01/04/2022 01:28 AM     No results found for: HBA1C, [id number], [id number]    Nutr. Meds:  Folvite, Miralax PRN    Wounds:    None       Current Nutrition Therapies:  ADULT DIET Easy to Chew    Anthropometric Measures:  · Height:  5' 11\" (180.3 cm)  · Current Body Wt:  59 kg (130 lb)   · Admission Body Wt:  130 lb (stated)    · Usual Body Wt:  83.9 kg (185 lb) (stated)     · Ideal Body Wt:  172 lbs:  75.6 %   · Body mass index is 18.13 kg/m². · BMI Category:  Underweight (BMI less than 22) age over 72       Nutrition Diagnosis:   · Severe malnutrition related to inadequate protein-energy intake as evidenced by weight loss,BMI,severe muscle loss,severe loss of subcutaneous fat    Nutrition Interventions:   Food and/or Nutrient Delivery: Continue current diet,Start oral nutrition supplement  Nutrition Education and Counseling: No recommendations at this time  Coordination of Nutrition Care: Continue to monitor while inpatient,Interdisciplinary rounds    Goals:  PO intake >/= 80% estimated protein needs within 2 - 3 days       Nutrition Monitoring and Evaluation:   Behavioral-Environmental Outcomes: None identified  Food/Nutrient Intake Outcomes: Food and nutrient intake,Supplement intake  Physical Signs/Symptoms Outcomes: Biochemical data,Weight    Discharge Planning:     Too soon to determine     Electronically signed by Noman Melton RD on 0/5/5097   Contact: [phone] or via "[employer]"

## 2022-01-04 NOTE — PROGRESS NOTES
Patient moved to room 5. Patient had BM after cleaning up patient, patient became lethargic and only responding to pain. Notified Palliative medicine NP through [employer] and notified family of agonal breathing. Family requested to speak with patient on phone, phone held up next to ear. Will monitor patient closely.

## 2022-01-04 NOTE — PROGRESS NOTES
1/4/2022  12:48 PM  Case management note    Patient was discussed in rounds. Received call from geriatric clinic at [facility]. Papo Bach is  who follows him. She verified his MPOA which is [facility]  which I left message for her to call. Patient will most likely need placement as he is unable to care for himself. I spoke with cousin Zac Elliott, will send referrals to [facility], [facility] and [facility]. and [facility]. Will continue to follow.   Mindi Ly

## 2022-01-04 NOTE — PROGRESS NOTES
Spiritual Care Assessment/Progress Note  [address]      NAME: Denis Garcia      MRN: [id number]  AGE: 73 y.o. SEX: male  Hoahaoism Affiliation: No preference   Language: English     1/4/2022     Total Time (in minutes): 7     Spiritual Assessment begun in [facility] EMERGENCY DEPT through conversation with:         [x]Patient        [] Family    [] Friend(s)        Reason for Consult: Palliative Care, Initial/Spiritual Assessment     Spiritual beliefs: (Please include comment if needed)     [] Identifies with a nadira tradition:         [] Supported by a nadira community:            [] Claims no spiritual orientation:           [] Seeking spiritual identity:                [] Adheres to an individual form of spirituality:           [x] Not able to assess:                           Identified resources for coping:      [] Prayer                               [] Music                  [] Guided Imagery     [] Family/friends                 [] Pet visits     [] Devotional reading                         [x] Unknown     [] Other:                                              Interventions offered during this visit: (See comments for more details)    Patient Interventions: Initial/Spiritual assessment, patient floor (Attempted)           Plan of Care:     [] Support spiritual and/or cultural needs    [] Support AMD and/or advance care planning process      [] Support grieving process   [] Coordinate Rites and/or Rituals    [] Coordination with community clergy   [] No spiritual needs identified at this time   [] Detailed Plan of Care below (See Comments)  [] Make referral to Music Therapy  [] Make referral to Pet Therapy     [] Make referral to Addiction services  [] Make referral to [facility]  [] Make referral to Spiritual Care Partner  [] No future visits requested        [x] Contact Spiritual Care for further referrals     Comments: Initial spiritual assessment in ER.   Mr. Yudy Wu was clear he wanted to see a nurse about some medication. He was glad that lunch arrive. He did not appear to want to share more at this time. Unable to assess spirituality at this time. Contact Spiritual Care for any further referrals.   Jojo Rushing., MS., [address] ([id number])

## 2022-01-04 NOTE — PROGRESS NOTES
Pieter Snyder [facility]  [address] [facility], [address] [place] [address]  [phone]      Medical Progress Note      NAME: Armand Jaquez   :  1950  MRM:  [id number]    Date of service: 2022  7:11 AM       Assessment and Plan:   1. Anemia - s/p PRBC transfusion. keep Hb > 7. stool occult neg. Consult hematology and GI    2. Hypokalemia - replete     3. Metastatic prostate cancer - mets to bone; follows with urology, s/p radiation, consult palliative care here as he was considering hospice per geriatric medicine note     4. History of lung adenocarcinoma - s/p b/l lobectomy. outpatient FU    5. Chronic pain -continue home meds. 6.  Anxiety. Continue home meds     7. Underweight - BMI 18.13; c/s nutrition; on remeron at bedtime prn for appetite    8. COPD without acute exacerbation - stable. Subjective:     Chief Complaint[de-identified] Patient was seen and examined as a follow up for anemia. Chart was reviewed. ROS:  (bold if positive, if negative)    Tolerating PT  Tolerating Diet        Objective:     Last 24hrs VS reviewed since prior progress note.  Most recent are:    Visit Vitals  /64 (BP 1 Location: Left upper arm, BP Patient Position: At rest)   Pulse 86   Temp 98 °F (36.7 °C)   Resp 22   Ht 5' 11\" (1.803 m)   Wt 59 kg (130 lb)   SpO2 100%   BMI 18.13 kg/m²     SpO2 Readings from Last 6 Encounters:   22 100%            Intake/Output Summary (Last 24 hours) at 2022 0711  Last data filed at 1/3/2022 1701  Gross per 24 hour   Intake 1310 ml   Output    Net 1310 ml        Physical Exam:    Gen:  Well-developed, well-nourished, in no acute distress  HEENT:  Pink conjunctivae, PERRL, hearing intact to voice, moist mucous membranes  Neck:  Supple, without masses, thyroid non-tender  Resp:  No accessory muscle use, clear breath sounds without wheezes rales or rhonchi  Card:  No murmurs, normal S1, S2 without thrills, bruits or peripheral edema  Abd:  Soft, non-tender, non-distended, normoactive bowel sounds are present, no palpable organomegaly and no detectable hernias  Lymph:  No cervical or inguinal adenopathy  Musc:  No cyanosis or clubbing  Skin:  No rashes or ulcers, skin turgor is good  Neuro:  Cranial nerves are grossly intact, no focal motor weakness, follows commands appropriately  Psych:  Good insight, oriented to person, place and time, alert  __________________________________________________________________  Medications Reviewed: (see below)  Medications:     Current Facility-Administered Medications   Medication Dose Route Frequency    0.9% sodium chloride infusion 250 mL  250 mL IntraVENous PRN    sodium chloride (NS) flush 5-40 mL  5-40 mL IntraVENous Q8H    sodium chloride (NS) flush 5-40 mL  5-40 mL IntraVENous PRN    acetaminophen (TYLENOL) tablet 650 mg  650 mg Oral Q6H PRN    Or    acetaminophen (TYLENOL) suppository 650 mg  650 mg Rectal Q6H PRN    polyethylene glycol (MIRALAX) packet 17 g  17 g Oral DAILY PRN    ondansetron (ZOFRAN ODT) tablet 4 mg  4 mg Oral Q8H PRN    Or    ondansetron (ZOFRAN) injection 4 mg  4 mg IntraVENous Q6H PRN    0.9% sodium chloride infusion 250 mL  250 mL IntraVENous PRN     Current Outpatient Medications   Medication Sig    albuterol (PROVENTIL HFA, VENTOLIN HFA, PROAIR HFA) 90 mcg/actuation inhaler Take 2 Puffs by inhalation every six (6) hours as needed for Wheezing.  Calcium-Cholecalciferol, D3, 500 mg-10 mcg (400 unit) tab Take 1 Tablet by mouth two (2) times a day.  cholecalciferol (VITAMIN D3) (1000 Units /25 mcg) tablet Take 1,000 Units by mouth daily.  cyanocobalamin (VITAMIN B12) 100 mcg tablet Take 100 mcg by mouth daily.  docusate sodium (COLACE) 100 mg capsule Take 200 mg by mouth two (2) times a day.  DULoxetine (CYMBALTA) 30 mg capsule Take 30 mg by mouth daily.  enzalutamide (XTANDI) 40 mg capsule Take 160 mg by mouth daily.     ferrous sulfate 325 mg (65 mg iron) tablet Take 325 mg by mouth Daily (before breakfast).  leuprolide depot (Lupron Depot, 6 Month,) 45 mg injection 45 mg by IntraMUSCular route every 6 months.  lidocaine (LIDODERM) 5 % 1 Patch by TransDERmal route every twenty-four (24) hours. Apply patch to the affected area for 12 hours a day and remove for 12 hours a day.  mirtazapine (REMERON) 7.5 mg tablet Take 7.5 mg by mouth nightly.  naloxone (Narcan) 4 mg/actuation nasal spray 1 [place] by IntraNASal route once as needed for Overdose. Use 1 spray intranasally, then discard. Repeat with new spray every 2 min as needed for opioid overdose symptoms, alternating nostrils.  ondansetron (ZOFRAN ODT) 4 mg disintegrating tablet Take 4 mg by mouth every eight (8) hours as needed for Nausea or Vomiting.  oxybutynin chloride XL (DITROPAN XL) 10 mg CR tablet Take 10 mg by mouth daily.  oxyCODONE ER (OxyCONTIN) 15 mg ER tablet Take 30 mg by mouth every twelve (12) hours.  oxyCODONE IR (ROXICODONE) 10 mg tab immediate release tablet Take 20 mg by mouth every three (3) hours as needed for Pain (breakthrough pain).  senna (Senna) 8.6 mg tablet Take 2 Tablets by mouth nightly as needed for Constipation.  tamsulosin (FLOMAX) 0.4 mg capsule Take 0.4 mg by mouth daily.  umeclidinium (Incruse Ellipta) 62.5 mcg/actuation inhaler Take 1 Puff by inhalation daily.  furosemide (LASIX) 20 mg tablet Take 20 mg by mouth daily as needed (edema/swelling). Lab Data Reviewed: (see below)  Lab Review:     Recent Labs     01/04/22  0128 01/03/22  2302 01/03/22  1803 01/03/22  1220 01/03/22  1220   WBC 4.7  --   --   --  5.0   HGB 5.6* 5.7* 6.2*   < > 4.6*   HCT 18.8* 19.2* 21.1*   < > 16.2*     --   --   --  261    < > = values in this interval not displayed.      Recent Labs     01/04/22  0128 01/03/22  1220   * 140   K 2.7* 2.5*    98   CO2 37* 38*   GLU 99 110*   BUN 13 15   CREA 0.68* 0.81   CA 7.3* 7.8*   ALB  --  2.0*   TBILI  --  0.7 ALT  --  18     No results found for: GLUCPOC  No results for input(s): PH, PCO2, PO2, HCO3, FIO2 in the last 72 hours. No results for input(s): INR, INREXT in the last 72 hours. All Micro Results     Procedure Component Value Units Date/Time    CULTURE, BLOOD, PAIRED [[id number]] Collected: 01/03/22 1248    Order Status: Completed Specimen: Blood Updated: 01/04/22 0630     Special Requests: NO SPECIAL REQUESTS        Culture result: NO GROWTH AFTER 16 HOURS       URINE CULTURE HOLD SAMPLE [[id number]] Collected: 01/03/22 1542    Order Status: Completed Specimen: Urine from Serum Updated: 01/03/22 1548     Urine culture hold       Urine on hold in Microbiology dept for 2 days. If unpreserved urine is submitted, it cannot be used for addtional testing after 24 hours, recollection will be required. CULTURE, URINE [[id number]]     Order Status: Sent Specimen: Cath Urine           I have reviewed notes of prior 24hr. Other pertinent lab: Total time spent with patient: 28 I personally reviewed chart, notes, data and current medications in the medical record. I have personally examined and treated the patient at bedside during this period.                  Care Plan discussed with: Patient, Nursing Staff and >50% of time spent in counseling and coordination of care    Discussed:  Care Plan    Prophylaxis:  SCD's    Disposition:  Home w/Family           ___________________________________________________    Attending Physician: Hussein Gray MD

## 2022-01-04 NOTE — CONSULTS
Palliative Medicine Consult  Eber: [phone] ([phone])    Patient Name: Emir Dawson  YOB: 1950    Date of Initial Consult: 2021  Reason for Consult: Care discussion  Requesting Provider: Dr. Samanta Harry  Primary Care Physician: Lexy Saldaña MD     SUMMARY:   Emir Dawson is a 73 y.o. with a past history of prostate cancer with mets to the bone (followed by [facility]), lung cancer s/p bilateral lobectomy, chronic pain, anxiety, COPD, who was admitted on 1/3/2022 from home with a diagnosis of severe anemia, hypokalemia and altered mental status. Course of hospitalization: Oncology following, patient with disease progression, not candidate for additional chemo or radiation     Current medical issues leading to Palliative Medicine involvement include: Goals of care discussion in setting of end-stage metastatic prostate cancer. Psychosocial history: Originally from [place], raised by his grandmother, had 2 brothers that  early in life, one at 12 and the other at the age of 21. Never  and no children. Has had a variety of jobs, including construction and cooking. Had been living in his own home, until he lost heat and water, moved in with one of his male cousins. Patient's cousin Kuldip Lars actively involved. Baseline cognitive and functional status: Over the past 2 weeks patient has become increasingly confused, weaker and unable to manage personal care. Prior to this, family reports he had been oriented. PALLIATIVE DIAGNOSES:   1. Palliative care encounter  2. Concerned about end-of-life  3. Altered mental status, unspecified  4. Hypoalbuminemia  5. Advance care planning discussion   6. DNR discussion  7. Goals of care discussion  8. Pain       PLAN:   1. Prior to visit completed chart review and discussed with patient's nurse Amadou Gardner.   2. I spoke with unit CM Joseph Lambert, who spoke with his Kacie, who stated patient has an AMD designating his cousin Basilio Lazcano is primary health decision maker. Benjie requested copy of AMD be faxed. 3. I met with patient, he is lethargic, weak, confused, little insight regarding hospitalization. Patient was able to confirm that his cousin Basilio Lazcano is his primary health decision maker. 4.  I called Basilio Lazcano, introduced myself and role of palliative. Ms. Aristeo Wagoner is very knowledgeable re patients health history. She informed me that she had recently talked with patients oncologist, who informed her that disease has continued to progress despite treatment, that he is at end stages and recommended Hospice. 5. Mrs. Luong has very good insight into patient's health and decline. 6. Goals of care discussion-patient's primary healthcare decision-maker Joe luong elected to transition patient to comfort only care, while team works on discharge to a long-term care facility with hospice. Unit  Frank Low has already reached out to [facility]. 7. DNR discussion-primary healthcare decision-maker elected DNR CODE STATUS. DNR order placed in EMR. Recommend obtaining a durable DNR prior to discharge. 8. Pain-chronic cancer related pain 2/2 prostate cancer with mets to the bone. Per  patients home regimen consists of OxyContin 20 mg orally every 12 hours, with oxycodone IR 20 mg every 4 hours as needed (last filled 12/18/2021). Per nurse, patient still able to swallow pills, I ordered placed to resume the above home regimen. 9. Comfort orders placed to manage symptoms of pain/shortness of breath/secretions/anxiety etc.  10. Psychosocial and baseline cognitive and functional status assessed, see information above  11. Palliative team will continue to follow along and assist with symptom management. Please contact team via perfect serve with any urgent needs. 12. Initial consult note routed to primary continuity provider and/or primary health care team members  13.  Communicated plan of care with: Palliative IDT, [facility]  and attending Dr. Hilton Can / TREATMENT PREFERENCES:     GOALS OF CARE: Comfort only care       TREATMENT PREFERENCES:   Code Status: Full Code    Patient and family's personal goals include: Maintaining comfort    Important upcoming milestones or family events: Unknown    The patient identifies the following as important for living well: Being comfortable      Advance Care Planning:  [x] The [facility] Interdisciplinary Team has updated the ACP Navigator with [address] and Patient Capacity      No flowsheet data found. Other:    As far as possible, the palliative care team has discussed with patient / health care proxy about goals of care / treatment preferences for patient.      HISTORY:     History obtained from: Medical records/nurse//family  CHIEF COMPLAINT: N/A    HPI/SUBJECTIVE:    The patient is:   [] Verbal and participatory  [x] Non-participatory due to:   Patient weak and frail, oriented to self and family, mostly answering yes/no questions and simple open-ended questions, unable to provide ROS,     Patient did nod head yes to pain consistently, but unable to report where he has pain    Clinical Pain Assessment (nonverbal scale for severity on nonverbal patients):              Duration: for how long has pt been experiencing pain (e.g., 2 days, 1 month, years)  Frequency: how often pain is an issue (e.g., several times per day, once every few days, constant)     FUNCTIONAL ASSESSMENT:     Palliative Performance Scale (PPS): 30          PSYCHOSOCIAL/SPIRITUAL SCREENING:     Palliative IDT has assessed this patient for cultural preferences / practices and a referral made as appropriate to needs (Cultural Services, Patient Advocacy, Ethics, etc.)    Any spiritual / Scientologist concerns: Unable to assess[] Yes /  [] No   If \"Yes\" to discuss with pastoral care during IDT Does caregiver feel burdened by caring for their loved one:   [] Yes /  [] No /  [] No Caregiver Present    If \"Yes\" to discuss with social work during IDT    Anticipatory grief assessment:   [x] Normal  / [] Maladaptive     If \"Maladaptive\" to discuss with social work during IDT    ESAS Anxiety:  Unable to assess    ESAS Depression:   Unable to assess       REVIEW OF SYSTEMS:     Positive and pertinent negative findings in ROS are noted above in HPI. The following systems were [x] reviewed / [] unable to be reviewed as noted in HPI  Other findings are noted below. Systems: constitutional, ears/nose/mouth/throat, respiratory, gastrointestinal, genitourinary, musculoskeletal, integumentary, neurologic, psychiatric, endocrine. Positive findings noted below. Modified ESAS Completed by: provider                                            PHYSICAL EXAM:     From RN flowsheet:  Wt Readings from Last 3 Encounters:   01/03/22 130 lb (59 kg)     Blood pressure 133/73, pulse 80, temperature 98.2 °F (36.8 °C), resp. rate 18, height 5' 11\" (1.803 m), weight 130 lb (59 kg), SpO2 100 %. Pain Scale 1: Numeric (0 - 10)  Pain Intensity 1: 0                 Last bowel movement, if known:     Constitutional: Appears older than stated age, pale, cachectic, frail, uncomfortable  Eyes: pupils equal, anicteric  ENMT: no nasal discharge, dry mucous membranes, poor dentition  Cardiovascular: regular rhythm, distal pulses intact  Respiratory: breathing not labored, symmetric  Gastrointestinal: soft non-tender, +bowel sounds  Musculoskeletal: no deformity, no tenderness to palpation  Skin: warm, dry, edema lower extremities bilaterally  Neurologic: Confused, able to answer simple questions, intermittently follow simple commands, very weak  Psychiatric: Unable to assess  Other:       HISTORY:     Active Problems:    Hypokalemia (1/3/2022)      Severe anemia (1/3/2022)      No past medical history on file.    No past surgical history on file. No family history on file. History reviewed, no pertinent family history. Social History     Tobacco Use    Smoking status: Not on file    Smokeless tobacco: Not on file   Substance Use Topics    Alcohol use: Not on file     Allergies   Allergen Reactions    Valium [Diazepam] Anxiety      Current Facility-Administered Medications   Medication Dose Route Frequency    0.9% sodium chloride infusion 250 mL  250 mL IntraVENous PRN    folic acid (FOLVITE) tablet 1 mg  1 mg Oral DAILY    sodium chloride (NS) flush 5-40 mL  5-40 mL IntraVENous Q8H    sodium chloride (NS) flush 5-40 mL  5-40 mL IntraVENous PRN    acetaminophen (TYLENOL) tablet 650 mg  650 mg Oral Q6H PRN    Or    acetaminophen (TYLENOL) suppository 650 mg  650 mg Rectal Q6H PRN    polyethylene glycol (MIRALAX) packet 17 g  17 g Oral DAILY PRN    ondansetron (ZOFRAN ODT) tablet 4 mg  4 mg Oral Q8H PRN    Or    ondansetron (ZOFRAN) injection 4 mg  4 mg IntraVENous Q6H PRN    0.9% sodium chloride infusion 250 mL  250 mL IntraVENous PRN     Current Outpatient Medications   Medication Sig    albuterol (PROVENTIL HFA, VENTOLIN HFA, PROAIR HFA) 90 mcg/actuation inhaler Take 2 Puffs by inhalation every six (6) hours as needed for Wheezing.  Calcium-Cholecalciferol, D3, 500 mg-10 mcg (400 unit) tab Take 1 Tablet by mouth two (2) times a day.  cholecalciferol (VITAMIN D3) (1000 Units /25 mcg) tablet Take 1,000 Units by mouth daily.  cyanocobalamin (VITAMIN B12) 100 mcg tablet Take 100 mcg by mouth daily.  docusate sodium (COLACE) 100 mg capsule Take 200 mg by mouth two (2) times a day.  DULoxetine (CYMBALTA) 30 mg capsule Take 30 mg by mouth daily.  enzalutamide (XTANDI) 40 mg capsule Take 160 mg by mouth daily.  ferrous sulfate 325 mg (65 mg iron) tablet Take 325 mg by mouth Daily (before breakfast).  leuprolide depot (Lupron Depot, 6 Month,) 45 mg injection 45 mg by IntraMUSCular route every 6 months.     lidocaine (LIDODERM) 5 % 1 Patch by TransDERmal route every twenty-four (24) hours. Apply patch to the affected area for 12 hours a day and remove for 12 hours a day.  mirtazapine (REMERON) 7.5 mg tablet Take 7.5 mg by mouth nightly.  naloxone (Narcan) 4 mg/actuation nasal spray 1 [place] by IntraNASal route once as needed for Overdose. Use 1 spray intranasally, then discard. Repeat with new spray every 2 min as needed for opioid overdose symptoms, alternating nostrils.  ondansetron (ZOFRAN ODT) 4 mg disintegrating tablet Take 4 mg by mouth every eight (8) hours as needed for Nausea or Vomiting.  oxybutynin chloride XL (DITROPAN XL) 10 mg CR tablet Take 10 mg by mouth daily.  oxyCODONE ER (OxyCONTIN) 15 mg ER tablet Take 30 mg by mouth every twelve (12) hours.  oxyCODONE IR (ROXICODONE) 10 mg tab immediate release tablet Take 20 mg by mouth every three (3) hours as needed for Pain (breakthrough pain).  senna (Senna) 8.6 mg tablet Take 2 Tablets by mouth nightly as needed for Constipation.  tamsulosin (FLOMAX) 0.4 mg capsule Take 0.4 mg by mouth daily.  umeclidinium (Incruse Ellipta) 62.5 mcg/actuation inhaler Take 1 Puff by inhalation daily.  furosemide (LASIX) 20 mg tablet Take 20 mg by mouth daily as needed (edema/swelling). LAB AND IMAGING FINDINGS:     Lab Results   Component Value Date/Time    WBC 4.7 01/04/2022 01:28 AM    HGB 7.8 (L) 01/04/2022 09:43 AM    PLATELET 163 48/56/8623 01:28 AM     Lab Results   Component Value Date/Time    Sodium 146 (H) 01/04/2022 01:28 AM    Potassium 2.7 (LL) 01/04/2022 01:28 AM    Chloride 102 01/04/2022 01:28 AM    CO2 37 (H) 01/04/2022 01:28 AM    BUN 13 01/04/2022 01:28 AM    Creatinine 0.68 (L) 01/04/2022 01:28 AM    Calcium 7.3 (L) 01/04/2022 01:28 AM      Lab Results   Component Value Date/Time    Alk.  phosphatase 735 (H) 01/03/2022 12:20 PM    Protein, total 6.2 (L) 01/03/2022 12:20 PM    Albumin 2.0 (L) 01/03/2022 12:20 PM Globulin 4.2 (H) 01/03/2022 12:20 PM     No results found for: INR, PTMR, PTP, PT1, PT2, APTT, INREXT   Lab Results   Component Value Date/Time    Iron 106 01/03/2022 06:03 PM    TIBC 109 (L) 01/03/2022 06:03 PM    Iron % saturation 97 (H) 01/03/2022 06:03 PM    Ferritin 2,471 (H) 01/03/2022 06:03 PM      No results found for: PH, PCO2, PO2  No components found for: GLPOC   No results found for: CPK, CKMB             Total time: 70 min  Counseling / coordination time, spent as noted above: 55 min  > 50% counseling / coordination?:  Yes    Prolonged service was provided for  []30 min   []75 min in face to face time in the presence of the patient, spent as noted above. Time Start:   Time End:   Note: this can only be billed with 54995 (initial) or 40847 (follow up). If multiple start / stop times, list each separately.

## 2022-01-04 NOTE — CONSULTS
Palliative medicine brief note- Full visit documentation pending    I spoke with unit AMELIA Helm, who spoke with his [place], who stated patient has an AMD designating his cousin Ameena Palma is primary health decision maker. Jennaania Amezcua requested copy of AMD be faxed. I met with patient, he is lethargic, weak, confused, little insight regarding hospitalization. Patient was able to confirm that his cousin Ameena Palma is his primary health decision maker. I called Ameena Palma, introduced myself and role of palliative. Ms. Arlen Gallardo is very knowledgeable re patients health history. She informed me that she had talked with patients Oncology group a couple of weeks ago, informed her that disease has continued to progress, he is at end stages and they recommended Hospice. DNR discussion- Ms. Luong elected DNR code status. DNR order placed in EMR. [address] Discussion- Per Ms. Luong,  Plan is for patient to be discharged to LTC facility with Hospice. Jaimee Wolfe has contacted [facility] and working with them on placement. Until then,  Ms. Luong is in agreement with transitioning patient to  Comfort  measures only. Comfort orders placed. Palliative team will continue to follow along and assist with symptom management. Please contact Palliative via perfect serve with any urgent needs, questions or concerns.

## 2022-01-04 NOTE — PROGRESS NOTES
Problem: Activity Intolerance  Goal: *Oxygen saturation during activity within specified parameters  Outcome: Progressing Towards Goal  Goal: *Able to remain out of bed as prescribed  Outcome: Progressing Towards Goal     Problem: Patient Education: Go to Patient Education Activity  Goal: Patient/Family Education  Outcome: Progressing Towards Goal     Problem: Anemia Care Plan (Adult and Pediatric)  Goal: *Labs within defined limits  Outcome: Progressing Towards Goal  Goal: *Tolerates increased activity  Outcome: Progressing Towards Goal     Problem: Patient Education: Go to Patient Education Activity  Goal: Patient/Family Education  Outcome: Progressing Towards Goal     Problem: Falls - Risk of  Goal: *Absence of Falls  Description: Document Kim Fall Risk and appropriate interventions in the flowsheet.   Outcome: Progressing Towards Goal  Note: Fall Risk Interventions:  Mobility Interventions: Bed/chair exit alarm,Communicate number of staff needed for ambulation/transfer,OT consult for ADLs,Patient to call before getting OOB,PT Consult for mobility concerns,PT Consult for assist device competence    Mentation Interventions: Adequate sleep, hydration, pain control,Bed/chair exit alarm,Update white board,Evaluate medications/consider consulting pharmacy,Increase mobility,More frequent rounding,Reorient patient,Room close to nurse's station    Medication Interventions: Bed/chair exit alarm,Evaluate medications/consider consulting pharmacy,Patient to call before getting OOB    Elimination Interventions: Call light in reach,Patient to call for help with toileting needs,Toilet paper/wipes in reach,Toileting schedule/hourly rounds    History of Falls Interventions: Bed/chair exit alarm,Consult care management for discharge planning,Room close to nurse's station         Problem: Patient Education: Go to Patient Education Activity  Goal: Patient/Family Education  Outcome: Progressing Towards Goal     Problem: Pain  Goal: *Control of Pain  Outcome: Progressing Towards Goal  Goal: *PALLIATIVE CARE:  Alleviation of Pain  Outcome: Progressing Towards Goal     Problem: Patient Education: Go to Patient Education Activity  Goal: Patient/Family Education  Outcome: Progressing Towards Goal

## 2022-01-04 NOTE — PROGRESS NOTES
Pt is now DNR and mPOA wants hospice and discharge to LTC. Will cancel hematology and GI consults.  Appreciate palliative care consult

## 2022-01-05 NOTE — PROGRESS NOTES
Nutrition Note    BPA for pressure ulcer. Dietitian already following - please see full note from 1/4/2022. Noted patient is now comfort measures only. Continue to provide PO for comfort, and Ensure Enlive TID as tolerated. Will re-screen as indicated.      Electronically signed by Sadaf Peng RD on 7/2/7146   Contact: [phone] or via V[employer]

## 2022-01-05 NOTE — CONSULTS
Palliative Medicine Consult  Eber: [phone] ([phone])    Patient Name: Bharathi Ambrocio  YOB: 1950    Date of Initial Consult: 2021  Reason for Consult: Care discussion  Requesting Provider: Dr. Albino Malhotra  Primary Care Physician: Bhargav Amin MD     SUMMARY:   Bharathi Ambrocio is a 73 y.o. with a past history of prostate cancer with mets to the bone (followed by [facility]), lung cancer s/p bilateral lobectomy, chronic pain, anxiety, COPD, who was admitted on 1/3/2022 from home with a diagnosis of severe anemia, hypokalemia and altered mental status. He is not a candidate for further cancer directed therapy. Palliative care consulted for goals of care. Psychosocial history: Originally from [place], raised by his grandmother, had 2 brothers that  early in life, one at 12 and the other at the age of 21. Never  and no children. Has had a variety of jobs, including construction and cooking. Had been living in his own home, until he lost heat and water, moved in with one of his male cousins. Patient's cousin Lior Huynh actively involved. Baseline cognitive and functional status: Over the past 2 weeks patient has become increasingly confused, weaker and unable to manage personal care. Prior to this, family reports he had been oriented. PALLIATIVE DIAGNOSES:   1. End of life care  2. Terminal delirium  3. Labored breathing  4. Metastatic prostate cancer     PLAN:   1. Called to ED to assess pt who is in receipt of comfort focused care for end stage metastatic prostate cancer. 2. Patient in active dying process this am in an unresponsive state with labored breathing, a RR of 40, hypotensive to the 80s. 3. PRNs since comfort measures initiated yesterday- Morphine 4 mg IV x 10, Ativan 1 mg IV x 4.    4. Home MEDD prior to admission = 240.   5. Plan-  1. Continue comfort focused care  2.  Transfer to comfort bed on 5th floor if available (out of ED)  3. Rotate to Dilaudid 2 mg IV q15 min prn and scheduled q4h   4. Schedule Ativan 1 mg IV q4h and use q15 min prn  5. Aretha Koehler called mPOA, message left. Will inform her that he is no longer stable for discharge to a facility and is expected to pass here. [address] following and will admit later today if necessary- however he is quite imminent. Will reassess need for GIP admission after transfer to .   7. Thank you for the opportunity to participate in the care of Denis Garcia  8. Please perfect serve with questions. 9. Communicated plan of care with: Palliative IDT, [facility] Team including bedside RN Emily Causey, Dr. Karmen Caputo / TREATMENT PREFERENCES:     GOALS OF CARE: Comfort only care  Patient/Health Care Proxy Stated Goals: Comfort    TREATMENT PREFERENCES:   Code Status: DNR    Patient and family's personal goals include: Maintaining comfort    Important upcoming milestones or family events: Unknown    The patient identifies the following as important for living well: Being comfortable      Advance Care Planning:  [x] [facility] [place] [facility] Interdisciplinary Team has updated the ACP Navigator with Health Care Decision Maker and Patient Capacity      Advance Care Planning 1/5/2022   Patient's Healthcare Decision Maker is: Named in scanned ACP document   Confirm Advance Directive Yes, on file       Medical Interventions: Comfort measures       Other:    As far as possible, the palliative care team has discussed with patient / health care proxy about goals of care / treatment preferences for patient.      HISTORY:     History obtained from: Medical records/nurse//family  CHIEF COMPLAINT: N/A    HPI/SUBJECTIVE:    The patient is:   [] Verbal and participatory  [x] Non-participatory due to: unresponsive state    Clinical Pain Assessment (nonverbal scale for severity on nonverbal patients):   Clinical Pain Assessment  Severity: 6  Location: pt unable to report  Character: pt unable to report  Duration: pt unable to report  Effect: pt unable to report  Factors: pt unable to report  Frequency: pt unable to report     Activity (Movement): Lying quietly, normal position    Duration: for how long has pt been experiencing pain (e.g., 2 days, 1 month, years)  Frequency: how often pain is an issue (e.g., several times per day, once every few days, constant)     FUNCTIONAL ASSESSMENT:     Palliative Performance Scale (PPS): 30  PPS: 10       PSYCHOSOCIAL/SPIRITUAL SCREENING:     Palliative IDT has assessed this patient for cultural preferences / practices and a referral made as appropriate to needs (Cultural Services, Patient Advocacy, Ethics, etc.)    Any spiritual / Anabaptism concerns: Unable to assess[] Yes /  [x] No   If \"Yes\" to discuss with pastoral care during IDT     Does caregiver feel burdened by caring for their loved one:   [] Yes /  [] No /  [x] No Caregiver Present    If \"Yes\" to discuss with social work during IDT    Anticipatory grief assessment:   [x] Normal  / [] Maladaptive     If \"Maladaptive\" to discuss with social work during IDT    ESAS Anxiety:  Unable to assess    ESAS Depression:   Unable to assess       REVIEW OF SYSTEMS:     Positive and pertinent negative findings in ROS are noted above in HPI. The following systems were [x] reviewed / [] unable to be reviewed as noted in HPI  Other findings are noted below. Systems: constitutional, ears/nose/mouth/throat, respiratory, gastrointestinal, genitourinary, musculoskeletal, integumentary, neurologic, psychiatric, endocrine. Positive findings noted below. Modified ESAS Completed by: provider           Pain: 6           Dyspnea: 10           Stool Occurrence(s): 1        PHYSICAL EXAM:     From RN flowsheet:  Wt Readings from Last 3 Encounters:   01/03/22 130 lb (59 kg)     Blood pressure (!) 72/47, pulse (!) 104, temperature 98 °F (36.7 °C), resp.  rate (!) 39, height 5' 11\" (1.803 m), weight 130 lb (59 kg), SpO2 (!) 67 %. Pain Scale 1: Numeric (0 - 10)  Pain Intensity 1: 0                 Last bowel movement, if known:     Cachectic, frail elderly male. Unresponsive  Rapid shallow breathing at a rate of 40. No audible OP secretions on exam.   No ankle edema  Skin warm and dry. Adult diaper in place. HISTORY:     Active Problems:    Hypokalemia (1/3/2022)      Severe anemia (1/3/2022)      No past medical history on file. No past surgical history on file. No family history on file. History reviewed, no pertinent family history. Social History     Tobacco Use    Smoking status: Not on file    Smokeless tobacco: Not on file   Substance Use Topics    Alcohol use: Not on file     Allergies   Allergen Reactions    Valium [Diazepam] Anxiety      Current Facility-Administered Medications   Medication Dose Route Frequency    HYDROmorphone (DILAUDID) injection 2 mg  2 mg IntraVENous Q4H    LORazepam (ATIVAN) injection 1 mg  1 mg IntraVENous Q4H    HYDROmorphone (DILAUDID) injection 2 mg  2 mg IntraVENous Q15MIN PRN    LORazepam (ATIVAN) injection 1 mg  1 mg IntraVENous Q15MIN PRN    glycopyrrolate (ROBINUL) injection 0.2 mg  0.2 mg IntraVENous Q4H PRN    morphine injection 4 mg  4 mg IntraVENous Q30MIN PRN    sodium chloride (NS) flush 5-40 mL  5-40 mL IntraVENous PRN    acetaminophen (TYLENOL) tablet 650 mg  650 mg Oral Q6H PRN    Or    acetaminophen (TYLENOL) suppository 650 mg  650 mg Rectal Q6H PRN    ondansetron (ZOFRAN ODT) tablet 4 mg  4 mg Oral Q8H PRN    Or    ondansetron (ZOFRAN) injection 4 mg  4 mg IntraVENous Q6H PRN     Current Outpatient Medications   Medication Sig    albuterol (PROVENTIL HFA, VENTOLIN HFA, PROAIR HFA) 90 mcg/actuation inhaler Take 2 Puffs by inhalation every six (6) hours as needed for Wheezing.  Calcium-Cholecalciferol, D3, 500 mg-10 mcg (400 unit) tab Take 1 Tablet by mouth two (2) times a day.     cholecalciferol (VITAMIN D3) (1000 Units /25 mcg) tablet Take 1,000 Units by mouth daily.  cyanocobalamin (VITAMIN B12) 100 mcg tablet Take 100 mcg by mouth daily.  docusate sodium (COLACE) 100 mg capsule Take 200 mg by mouth two (2) times a day.  DULoxetine (CYMBALTA) 30 mg capsule Take 30 mg by mouth daily.  enzalutamide (XTANDI) 40 mg capsule Take 160 mg by mouth daily.  ferrous sulfate 325 mg (65 mg iron) tablet Take 325 mg by mouth Daily (before breakfast).  leuprolide depot (Lupron Depot, 6 Month,) 45 mg injection 45 mg by IntraMUSCular route every 6 months.  lidocaine (LIDODERM) 5 % 1 Patch by TransDERmal route every twenty-four (24) hours. Apply patch to the affected area for 12 hours a day and remove for 12 hours a day.  mirtazapine (REMERON) 7.5 mg tablet Take 7.5 mg by mouth nightly.  naloxone (Narcan) 4 mg/actuation nasal spray 1 [place] by IntraNASal route once as needed for Overdose. Use 1 spray intranasally, then discard. Repeat with new spray every 2 min as needed for opioid overdose symptoms, alternating nostrils.  ondansetron (ZOFRAN ODT) 4 mg disintegrating tablet Take 4 mg by mouth every eight (8) hours as needed for Nausea or Vomiting.  oxybutynin chloride XL (DITROPAN XL) 10 mg CR tablet Take 10 mg by mouth daily.  oxyCODONE ER (OxyCONTIN) 15 mg ER tablet Take 30 mg by mouth every twelve (12) hours.  oxyCODONE IR (ROXICODONE) 10 mg tab immediate release tablet Take 20 mg by mouth every three (3) hours as needed for Pain (breakthrough pain).  senna (Senna) 8.6 mg tablet Take 2 Tablets by mouth nightly as needed for Constipation.  tamsulosin (FLOMAX) 0.4 mg capsule Take 0.4 mg by mouth daily.  umeclidinium (Incruse Ellipta) 62.5 mcg/actuation inhaler Take 1 Puff by inhalation daily.  furosemide (LASIX) 20 mg tablet Take 20 mg by mouth daily as needed (edema/swelling).           LAB AND IMAGING FINDINGS:     Lab Results   Component Value Date/Time    WBC 4.7 01/04/2022 01:28 AM    HGB 7.8 (L) 01/04/2022 09:43 AM PLATELET 506 47/29/7196 01:28 AM     Lab Results   Component Value Date/Time    Sodium 146 (H) 01/04/2022 01:28 AM    Potassium 2.7 (LL) 01/04/2022 01:28 AM    Chloride 102 01/04/2022 01:28 AM    CO2 37 (H) 01/04/2022 01:28 AM    BUN 13 01/04/2022 01:28 AM    Creatinine 0.68 (L) 01/04/2022 01:28 AM    Calcium 7.3 (L) 01/04/2022 01:28 AM      Lab Results   Component Value Date/Time    Alk. phosphatase 735 (H) 01/03/2022 12:20 PM    Protein, total 6.2 (L) 01/03/2022 12:20 PM    Albumin 2.0 (L) 01/03/2022 12:20 PM    Globulin 4.2 (H) 01/03/2022 12:20 PM     No results found for: INR, PTMR, PTP, PT1, PT2, APTT, INREXT, INREXT   Lab Results   Component Value Date/Time    Iron 106 01/03/2022 06:03 PM    TIBC 109 (L) 01/03/2022 06:03 PM    Iron % saturation 97 (H) 01/03/2022 06:03 PM    Ferritin 2,471 (H) 01/03/2022 06:03 PM      No results found for: PH, PCO2, PO2  No components found for: GLPOC   No results found for: CPK, CKMB             Total time:   Counseling / coordination time, spent as noted above:   > 50% counseling / coordination?:      Prolonged service was provided for  []30 min   []75 min in face to face time in the presence of the patient, spent as noted above. Time Start:   Time End:   Note: this can only be billed with 07414 (initial) or 57375 (follow up). If multiple start / stop times, list each separately.

## 2022-01-05 NOTE — PROGRESS NOTES
[facility]  [address] [facility], [address] [place] [address]  [phone]      Medical Progress Note      NAME: Gerhardt Eva   :  1950  MRM:  [id number]    Date of service: 2022  7:11 AM       Assessment and Plan:   Pt was admitted with severe anemia, Hb 4.6 s/p blood transfusion. Has extensive medical problems including metastatic prostate cancer, lung cancer, COPD, chronic pain. Received blood transfusion and his Hb improved, but overnight his condition worsened and became less responsive. Pt is not doing well and became lethargic and hypotensive. Care changed to comfort care and palliative care is managing comfort care measures. Subjective:     Chief Complaint[de-identified] Patient was seen and examined as a follow up for anemia. Chart was reviewed. ROS:  (bold if positive, if negative)    Tolerating PT  Tolerating Diet        Objective:     Last 24hrs VS reviewed since prior progress note.  Most recent are:    Visit Vitals  BP (!) 72/47   Pulse (!) 104   Temp 98 °F (36.7 °C)   Resp (!) 39   Ht 5' 11\" (1.803 m)   Wt 59 kg (130 lb)   SpO2 (!) 67%   BMI 18.13 kg/m²     SpO2 Readings from Last 6 Encounters:   22 (!) 67%    O2 Flow Rate (L/min): 3 l/min       Intake/Output Summary (Last 24 hours) at 2022 1035  Last data filed at 2022 1600  Gross per 24 hour   Intake 240 ml   Output    Net 240 ml        Physical Exam:    Gen:  Well-developed, well-nourished, in no acute distress  HEENT:  Pink conjunctivae, PERRL, hearing intact to voice, moist mucous membranes  Neck:  Supple, without masses, thyroid non-tender  Resp:  No accessory muscle use, clear breath sounds without wheezes rales or rhonchi  Card:  No murmurs, normal S1, S2 without thrills, bruits or peripheral edema  Abd:  Soft, non-tender, non-distended, normoactive bowel sounds are present, no palpable organomegaly and no detectable hernias  Lymph:  No cervical or inguinal adenopathy  Musc:  No cyanosis or clubbing  Skin:  No rashes or ulcers, skin turgor is good  Neuro:  Cranial nerves are grossly intact, no focal motor weakness, follows commands appropriately  Psych:  Good insight, oriented to person, place and time, alert  __________________________________________________________________  Medications Reviewed: (see below)  Medications:     Current Facility-Administered Medications   Medication Dose Route Frequency    HYDROmorphone (DILAUDID) injection 2 mg  2 mg IntraVENous Q4H    LORazepam (ATIVAN) injection 1 mg  1 mg IntraVENous Q4H    HYDROmorphone (DILAUDID) injection 2 mg  2 mg IntraVENous Q15MIN PRN    LORazepam (ATIVAN) injection 1 mg  1 mg IntraVENous Q15MIN PRN    glycopyrrolate (ROBINUL) injection 0.2 mg  0.2 mg IntraVENous Q4H PRN    morphine injection 4 mg  4 mg IntraVENous Q30MIN PRN    sodium chloride (NS) flush 5-40 mL  5-40 mL IntraVENous PRN    acetaminophen (TYLENOL) tablet 650 mg  650 mg Oral Q6H PRN    Or    acetaminophen (TYLENOL) suppository 650 mg  650 mg Rectal Q6H PRN    ondansetron (ZOFRAN ODT) tablet 4 mg  4 mg Oral Q8H PRN    Or    ondansetron (ZOFRAN) injection 4 mg  4 mg IntraVENous Q6H PRN        Lab Data Reviewed: (see below)  Lab Review:     Recent Labs     01/04/22  0943 01/04/22  0128 01/03/22  2302 01/03/22  1803 01/03/22  1220   WBC  --  4.7  --   --  5.0   HGB 7.8* 5.6* 5.7*   < > 4.6*   HCT 25.5* 18.8* 19.2*   < > 16.2*   PLT  --  227  --   --  261    < > = values in this interval not displayed. Recent Labs     01/04/22  0128 01/03/22  1220   * 140   K 2.7* 2.5*    98   CO2 37* 38*   GLU 99 110*   BUN 13 15   CREA 0.68* 0.81   CA 7.3* 7.8*   ALB  --  2.0*   TBILI  --  0.7   ALT  --  18     No results found for: GLUCPOC  No results for input(s): PH, PCO2, PO2, HCO3, FIO2 in the last 72 hours. No results for input(s): INR, INREXT, INREXT in the last 72 hours.   All Micro Results     Procedure Component Value Units Date/Time    CULTURE, BLOOD, PAIRED [[id number]] Collected: 01/03/22 1248    Order Status: Completed Specimen: Blood Updated: 01/05/22 0624     Special Requests: NO SPECIAL REQUESTS        Culture result: NO GROWTH 2 DAYS       URINE CULTURE HOLD SAMPLE [[id number]] Collected: 01/03/22 1542    Order Status: Completed Specimen: Urine from Serum Updated: 01/03/22 1548     Urine culture hold       Urine on hold in Microbiology dept for 2 days. If unpreserved urine is submitted, it cannot be used for addtional testing after 24 hours, recollection will be required. CULTURE, URINE [[id number]]     Order Status: Canceled Specimen: Cath Urine           I have reviewed notes of prior 24hr. Other pertinent lab: Total time spent with patient: 28 I personally reviewed chart, notes, data and current medications in the medical record. I have personally examined and treated the patient at bedside during this period.                  Care Plan discussed with: Patient, Nursing Staff and >50% of time spent in counseling and coordination of care    Discussed:  Care Plan    Prophylaxis:  SCD's    Disposition:  Home w/Family           ___________________________________________________    Attending Physician: Rita Brar MD

## 2022-01-05 NOTE — ACP (ADVANCE CARE PLANNING)
Primary Decision Maker (Active): Eileen Watson (cousin) - Other Relative - [phone]    Secondary Decision Maker: [address] Planning 1/5/2022   Patient's Healthcare Decision Maker is: Named in scanned ACP document   Confirm Advance Directive Yes, on file     Pt has AMD on file dated 11/11/1999 which appoints demi Lawson [place]) as primary Medical POA and [address] [place] [address] as secondary (relationship not specified). Pt has inpt DNR order in place, is comfort measures only.

## 2022-01-05 NOTE — HOSPICE
[facility]  [phone]     Patient Name: Ariel Franklin  YOB: 1950  Age: 73 y.o. [address] [facility] RN Note:  Hospice consult received, reviewing chart. Will follow up with Unit Nurse and Care Manager to discuss plan of care, patient status and discharge disposition     Patient appears to be actively dying. Medications being managed appropriately by palliative w/ hospice support. Not likely to survive to admit GIP. If patient survives overnight, will reconsider hospice admission    Thank you for the opportunity to be of service to this patient.

## 2022-01-05 NOTE — PROGRESS NOTES
2022  10:45 AM    Case management note    Gilesmaryellen Wasserman returned call. Notified her that patient condition had changed over night. Someone had held phone up to his ear overnight for her to speak to him. She is not coming to hospital at this point. She thanked us for the care and was glad he doesn't have to return to nursing home. She notified of  details:  [facility] and he will [facility] which is also taken care of.   Mindi Ly

## 2022-01-05 NOTE — PROGRESS NOTES
Patient attempted to get to edge of bed. Ripped Oxygen off. Assisted patient back to bed. O2 placed back on patient. Patient has garbled speech and appears to be in pain, wet secretion. Morphine prn and robinul prn given. Will endorse to nightshift. Will monitor closely for remainder to shift.

## 2022-01-05 NOTE — PROGRESS NOTES
TRANSFER - IN REPORT:    Verbal report received from [facility] (name) on Ethelene Evie  being received from ED(unit) for routine progression of care      Report consisted of patients Situation, Background, Assessment and   Recommendations(SBAR). Information from the following report(s) SBAR, Kardex, ED Summary, MAR, Recent Results and Med Rec Status was reviewed with the receiving nurse. Opportunity for questions and clarification was provided. Assessment completed upon patients arrival to unit and care assumed.

## 2022-01-05 NOTE — PROGRESS NOTES
Palliative Medicine Social Work Note    SW was notified by ED Care Manager of decline in pt's condition, requested that pt be evaluated for [facility] hospice. Pt was seen by Palliative Medicine NP on 1/4, is currently comfort measures only. Plan had been to discharge pt to long term care facility with [facility]; however, pt is now unresponsive, not stable for transport. Pt will be transferred to 5th floor, meds are being adjusted for comfort. CM left  for pt's Medical POA re: above. JEANNE will follow for support. Thank you for including Palliative Medicine in the care of Mr. Jude Bautista.       [address] [place] [address], [facility]-  Palliative Medicine:  [phone])

## 2022-01-05 NOTE — WOUND CARE
Wound Consult:  New consult Visit. Chart reviewed. Consulted for sacral wounds. Spoke with patients nurse,  Mike Thorpe at bedside for assessment. [facility]. Heels off loaded with pillows. Incontinent of liquid foul smelling stool, constant leakage. Care given  Patient is unresponsive; requires 2 assists to move side to side in bed. Assessment:all wounds POA  Sacral/right buttock- 3x2x0.1cm- speckled pink/yellow, moist ,blanching surrounding pink no drainage, no odor. PI unstageable  Right lower buttock- 0.4x0.4x0.1cm- pink/yellow, moist, blanching surrounding pink  Right upper posterior thigh- 0.4x0.4x0.1cm- pink, moist , no drainage, no erythema. Partial thickness. Left lower buttock- 1x1x0.1cm- pink, moist, no drainage,no surrounding pink  Left heel- red/pink, blanching, skin intact  Right heel- no redness noted  Right fingers- mottled, purplish. Left hip- 3x3 area of pink, blanching  Treatment  Thick layer of zinc guard applied to open areas. Reposition onto right side  Elevated heels on pillows  Wound Recommendations:  Sacrum/buttocks/thigh wounds- apply thick layer of zinc guard. Due to liquid stool unable to apply foam dressings at this time. Hospice consult. Skin Care / PI Prevention Recommendations:  1. Minimize friction/shear: minimize layers of linen/pads under patient. 2. Off load pressure/reposition:  turn and reposition approximately every 2 hours; float heels with pillows or use off loading heel boots; waffle cushion for sitting; position wedge. 3. Manage Moisture - keep skin folds dry; incontinence skin care with incontinence wipes; zinc guard barrier ointment. 4. Continue to monitor nutrition, pain, and skin risk scale, and skin assessment. Plan:  Spoke with Dr. Kaci Scott regarding findings and proposed orders for treatment.   We will continue to follow if hospice requests  Please re-consult should concerns arise despite continued skin/PI prevention measures.     [address] [facility], Wound / [address] [facility] Healing Office [phone]

## 2022-01-05 NOTE — PROGRESS NOTES
1/5/2022  TRANSFER - IN REPORT:    Verbal report received from Lorraine  on Krystina Johnson being transferred to [facility] surge room 512 for routine progression of care   Report consisted of patients Situation, Background, Assessment and   Recommendations(SBAR). Transition of Care Plan from [facility] 16%  moderate   Is This a Readmission NO  Is this a Bundle NO    1. Patient is comfort care  2. Referral was made to [address] [place] [address] for likely GIP admission  3. Patient not expected to survive hospitalization per ED CM  4.  CM will continue to follow    KATLYN Morton

## 2022-01-05 NOTE — PROGRESS NOTES
Bedside and Verbal shift change report given to JUSTIN Herrera (oncoming nurse) by Angela Araujo (offgoing nurse). Report included the following information SBAR, Kardex, Intake/Output, MAR, Recent Results, Med Rec Status.

## 2022-01-06 PROBLEM — C61 PROSTATE CANCER (HCC): Status: ACTIVE | Noted: 2022-01-01

## 2022-01-06 NOTE — DISCHARGE SUMMARY
Hospice Discharge Summary    [address] [place] [address]  [facility]        Date of Admission: 1/6/2022  Date of Discharge: 1/6/21    Demetrius Graham is a 73y.o. year old who was admitted to [address] [place] [address] at [facility] with a Hospice diagnosis of Prostate cancer ([facility] [address] Juno Morrison.       The patient's care was focused on comfort and the patient passed away on 1/6/21

## 2022-01-06 NOTE — PROGRESS NOTES
1/6/2022  Case Management Progress Note    11:18 AM  Patient is 73year old male admitted 1/3 with hypokalemia  Patient's RUR is 19% yellow/moderate risk for readmission  Covid test: none this admission   Chart reviewed--patient discussed at IDR rounds this morning  Spoke with Juan Carlos Marino RN from hospice--they are planning to admit patient to inpatient hospice today and per chart are awaiting return of consents from patient's POA. Will continue to follow and assist as needed. Transition of Care Plan   1. Continue medical management/treatment  2. Admit to GIP hospice today   3. Awaiting consents   4.  CM will follow as needed    Roylene Goodell, MSW

## 2022-01-06 NOTE — HOSPICE
[facility]  [phone]    Discharge/Death Nursing Note   Patient Name: Marvel Lazcano  YOB: 1950  Age: 73 y.o.     Date of Death: 22  Admitted Date: 2022  [facility] [address] [facility]  Level of Care: [facility]  Patient Room: [address] [place] [address] Attending: Sunshine Cannon MD  Hospice Diagnosis: Prostate cancer [facility]) Randy Victor Mshiv    Death Pronouncement   Pronouncement of death completed by: Niki Abebe staff was not present at the time of death    At the time of death the patient was documented as  no spontaneous respirations, no apical pulse for 1 full minute, and fixed pupils    The pt  within [facility]    The following were notified of the patient's death: MPOA notified by hospice liaison, MD, hospice intake     Medications were disposed of per facility protocol     Discharge Summary   Discharge Reason: Death    Summary of Care Provided:    [x] Post mortem care provided by bedside RN   [x] Notification of  home by nursing supervisor  [] Referrals/Community resources provided:   [] Goals completed  [] Durable Medical Equipment vendor notified     Disciplines involved: [x] RN [] SW [x]  [] CEDEÑO [] Vol [] PT [] OT [] ST [] BC    [x] IDT communication/notification    Attending Physician, Dr. Author [place], notified of death    Bereaved   Verify bereaved identified with name, address, telephone number and risk level      Advance Care Planning 2022   Patient's Healthcare Decision Maker is: Named in scanned ACP document   Confirm Advance Directive Yes, on file

## 2022-01-06 NOTE — H&P
Hospice medical director    Patient was admitted to [facility] level care late morning of 2021 secondary to metastatic prostate cancer with multiple other medical issues to include respiratory distress, metabolic encephalopathy, anxiety, pain. Patient transition to [facility] level care secondary to symptom burden. Plan reviewed with Alan Wood liaison/nurse. Patient  prior to the provider being able to see the patient as he  within almost 3 hours of admission. He had been followed by the palliative team as well so symptoms were well managed during his transition to comfort care and then eventually hospice.

## 2022-01-06 NOTE — HOSPICE
[address] [facility]  [phone]    Inpatient Nursing Admission   Patient Name: Nicole Rowe  YOB: 1950  Age: 73 y.o. Date of Hospice Admission:01/06/2022  Hospice Attending Elected by Patient: Radha Mishra MD  Primary Care Physician: Penny Sheehan MD  Admitting RN: Luis Massey RN  : Lavinia Gomez LCSW    Level of Care (GIP/Routine/Respite): GIP  Facility of Care: [facility]  Patient Room: [facility] Diagnosis: Prostate cancer [facility]) Valentine Lightdipesh  Onset Date of Hospice Diagnosis: 01/06/22  Summary of Disease Progression Leading to Hospice Diagnosis: Per palliative H&P Eron García is a 73 y.o. with a past history of prostate cancer with mets to the bone (followed by [facility]), lung cancer s/p bilateral lobectomy, chronic pain, anxiety, COPD, who was admitted on 1/3/2022 from home with a diagnosis of severe anemia, hypokalemia and altered mental status. He is not a candidate for further cancer directed therapy. Palliative care consulted for goals of care. \"   Initial plan was to get patient home w/ hospice however patient had significant decline  While in ED leading to severe pain and dyspnea. Poorly controlled despite IV medications. Will admit to hospice under IP LOC for IV medication management     Co-Morbidities:   Patient Active Problem List   Diagnosis Code    Hypokalemia E87.6    Severe anemia D64.9    Prostate cancer ([facility] [address] C61     Diagnoses RELATED to the terminal prognosis: anemia, altered mental status, dyspnea, pain      Rationale for a prognosis of life expectancy of 6 months or less if the disease follows its normal course (Disease Specific History):     Nicole Rowe is a 73 y.o. who was admitted to [address] [place] [address]. The patient's principle diagnosis of metastatic prostate cancer has resulted in decline in mental status, terminal pain and dyspnea, inability to tolerate PO.   Functionally, the patient's Palliative Performance Scale has declined over a period of days and is estimated at 10. Objective information that support this patients limited prognosis includes: responds only to pain, hypotensive dyspnea, . The patient/family chose comfort measures with the support of Hospice. Patient meets for GIP LOC as evidenced by high symptom burden requiring IV medication, frequent assessment and medication adjustments     Prognosis estimated based on 01/06/22 clinical assessment is:   [x] Few to Many Hours  [] Hours to Days   [] Few to Many Days   [] Days to Weeks   [] Few to Many Weeks   [] Weeks to Months   [] Few to Many Months    ASSESSMENT    Patient self-reports:  []  Yes    [] No    SYMPTOMS: terminal pain, dyspnea    SIGNS/PHYSICAL FINDINGS: shallow rapid breathing, non verbal signs of pain, unresponsive    KARNOFSKY: 10        Learning Assessment:  Patient unable to learn      Caregiver willing to learn and receptive to information given      CLINICAL INFORMATION     Wt Readings from Last 3 Encounters:   01/03/22 59 kg (130 lb)     Ht Readings from Last 3 Encounters:   01/04/22 5' 11\" (1.803 m)     There is no height or weight on file to calculate BMI. There were no vitals taken for this visit. LAB VALUES  No results found for this visit on 01/06/22 (from the past 12 hour(s)). No results found for this visit on 01/06/22 (from the past 6 hour(s)). Lab Results   Component Value Date/Time    Protein, total 6.2 (L) 01/03/2022 12:20 PM    Albumin 2.0 (L) 01/03/2022 12:20 PM       Currently this patient has:  [] Supplemental O2 [] Peripheral IV  [] PICC    [] PORT   [x] Aguirre Catheter [] NG Tube   [] PEG Tube [] Ostomy    [] AICD: Has ICD been deactivated? [] Yes [] No:______    PLAN     1. Admit to hospice under GIP LOC  2. Scheduled Dilaudid 3mg q2hr for pain and SOB  3. Scheduled Ativan 2mg q2hr   4. PRN medications in place for breakthrough symptom management  5.  Provide support to family at bedside  6. Infection control and prevention   7. Monitor closely for changes in symptoms that would warrant medication adjustments  8. Keep sacral wound clean and dry     Hospice Team Frequency Orders:  Skilled Nurse -  Daily x 7 days /every other day x 7 days  with 5 PRN visits for symptom control. MSW  1 visit for initial assessment/evaluation for family support and need for volunteer services. Earnstine Brisk  1 visit for initial assessment/evaluation for spiritual support. ADVANCE CARE PLANNING (Complete in ACP Flow Sheet)   Code Status: DNR  Durable DNR: [x]  Yes  []  No  Code Status Discussed/Confirmed:  yes  Preference for Other Life Sustaining Treatment Discussed/Confirmed: yes  Hospitalization Preference:  (ex. Patient would like to receive end of life care in the hospital, in a facility, at home etc.)  Advance Care Planning 2022   Patient's [address] is: Named in scanned ACP document   Confirm Advance Directive Yes, on file        Service: [] Yes  [x]  No      [] Unknown  Appropriate for Pinning Ceremony:  [] Yes     [x] No  Anabaptist: NO PREFERENCE   Home: [place] [id number]     1. Discharge Plan: LTC if symptoms stabilize   2. Patient/Family teaching: EOL symptom mgmt  3. Response to patient/family teaching: understood    SOCIAL/EMOTIONAL/SPIRITUAL NEEDS     Spiritual Issues Identified: unable to identify    Psych/ Social/ Emotional Issues Identified: no family at bedside, CARLOS is cousin who will to be coming to bedside but thankful for care provided to patient    Caregiver Support:  [x] Provided information on End of Life Care   [x] Material Provided: Gone From My Sight or Wilfredo Ruby contacted, discharge to hospice order received  Dr. Merlyn Hernandez contacted, agrees to serve as attending provider for hospice and provided verbal certification of terminal illness with life expectancy of 6 months or less.  Orders for hospice admission, medications and plan of treatment received. Medication reconciliation completed. MEDS: See medication list below  DME: Per hospital  Supplies: Per hospital  IDT communication to include MD, SN, SW, CH and support team    ALLERGIES AND MEDICATIONS     Allergies: Allergies   Allergen Reactions    Valium [Diazepam] Anxiety     Current Facility-Administered Medications   Medication Dose Route Frequency    ondansetron (ZOFRAN) injection 4 mg  4 mg IntraVENous Q4H PRN    LORazepam (ATIVAN) injection 2 mg  2 mg IntraVENous Q15MIN PRN    ketorolac (TORADOL) injection 30 mg  30 mg IntraVENous Q8H PRN    glycopyrrolate (ROBINUL) injection 0.2 mg  0.2 mg IntraVENous Q4H PRN    bisacodyL (DULCOLAX) suppository 10 mg  10 mg Rectal DAILY PRN    HYDROmorphone (DILAUDID) injection 3 mg  3 mg IntraVENous Q15MIN PRN    HYDROmorphone (DILAUDID) injection 3 mg  3 mg IntraVENous Q2H    LORazepam (ATIVAN) injection 2 mg  2 mg IntraVENous Q2H     Current Outpatient Medications   Medication Sig    albuterol (PROVENTIL HFA, VENTOLIN HFA, PROAIR HFA) 90 mcg/actuation inhaler Take 2 Puffs by inhalation every six (6) hours as needed for Wheezing.  Calcium-Cholecalciferol, D3, 500 mg-10 mcg (400 unit) tab Take 1 Tablet by mouth two (2) times a day.  cholecalciferol (VITAMIN D3) (1000 Units /25 mcg) tablet Take 1,000 Units by mouth daily.  cyanocobalamin (VITAMIN B12) 100 mcg tablet Take 100 mcg by mouth daily.  docusate sodium (COLACE) 100 mg capsule Take 200 mg by mouth two (2) times a day.  DULoxetine (CYMBALTA) 30 mg capsule Take 30 mg by mouth daily.  enzalutamide (XTANDI) 40 mg capsule Take 160 mg by mouth daily.  ferrous sulfate 325 mg (65 mg iron) tablet Take 325 mg by mouth Daily (before breakfast).  leuprolide depot (Lupron Depot, 6 Month,) 45 mg injection 45 mg by IntraMUSCular route every 6 months.     lidocaine (LIDODERM) 5 % 1 Patch by TransDERmal route every twenty-four (24) hours. Apply patch to the affected area for 12 hours a day and remove for 12 hours a day.  mirtazapine (REMERON) 7.5 mg tablet Take 7.5 mg by mouth nightly.  naloxone (Narcan) 4 mg/actuation nasal spray 1 Marion by IntraNASal route once as needed for Overdose. Use 1 spray intranasally, then discard. Repeat with new spray every 2 min as needed for opioid overdose symptoms, alternating nostrils.  ondansetron (ZOFRAN ODT) 4 mg disintegrating tablet Take 4 mg by mouth every eight (8) hours as needed for Nausea or Vomiting.  oxybutynin chloride XL (DITROPAN XL) 10 mg CR tablet Take 10 mg by mouth daily.  oxyCODONE ER (OxyCONTIN) 15 mg ER tablet Take 30 mg by mouth every twelve (12) hours.  oxyCODONE IR (ROXICODONE) 10 mg tab immediate release tablet Take 20 mg by mouth every three (3) hours as needed for Pain (breakthrough pain).  senna (Senna) 8.6 mg tablet Take 2 Tablets by mouth nightly as needed for Constipation.  tamsulosin (FLOMAX) 0.4 mg capsule Take 0.4 mg by mouth daily.  umeclidinium (Incruse Ellipta) 62.5 mcg/actuation inhaler Take 1 Puff by inhalation daily.  furosemide (LASIX) 20 mg tablet Take 20 mg by mouth daily as needed (edema/swelling).      Facility-Administered Medications Ordered in Other Encounters   Medication Dose Route Frequency    HYDROmorphone (DILAUDID) injection 3 mg  3 mg IntraVENous Q2H    LORazepam (ATIVAN) injection 2 mg  2 mg IntraVENous Q2H    HYDROmorphone (DILAUDID) injection 3 mg  3 mg IntraVENous Q15MIN PRN    LORazepam (ATIVAN) injection 2 mg  2 mg IntraVENous Q15MIN PRN    glycopyrrolate (ROBINUL) injection 0.2 mg  0.2 mg IntraVENous Q4H PRN    sodium chloride (NS) flush 5-40 mL  5-40 mL IntraVENous PRN    acetaminophen (TYLENOL) tablet 650 mg  650 mg Oral Q6H PRN    Or    acetaminophen (TYLENOL) suppository 650 mg  650 mg Rectal Q6H PRN    ondansetron (ZOFRAN ODT) tablet 4 mg  4 mg Oral Q8H PRN    Or    ondansetron (ZOFRAN) injection 4 mg  4 mg IntraVENous Q6H PRN

## 2022-01-06 NOTE — HOSPICE
[facility]  [phone]     Patient Name: Katie Dougherty  YOB: 1950  Age: 73 y.o. [address] [facility] RN Note: Survived overnight,  per chart review patient would meet IP criteria. . RR still documented in 40s, maybe benefit from medication adjustment or administration of PRNs. Will assess today and reach out to [facility] to discuss hospice admission      10:45  Patient appropriate for IP LOC. D/W hospice MD and [facility], [facility]. All in agreement for hospice admission. Consents sent to [facility] via Qgiv as she does not plan to come into the hospital. Awaiting return consents to admit     Thank you for the opportunity to be of service to this patient.

## 2022-01-06 NOTE — PROGRESS NOTES
Pts respiratory rate remains in the 30s. Will increase frequency of scheduled Dilaudid and Ativan from q4h to q3h to ensure comfort overnight.      Dr. Vin Rodrigez

## 2022-01-06 NOTE — DISCHARGE SUMMARY
[facility]  [address] [facility], [address] [place] [address]  [phone]    Physician Discharge Summary     Patient ID:  Crow Jay  [id number]  66 y.o.  1950    Admit date: 1/3/2022    Discharge date and time: 1/6/2022 11:56 AM    Admission Diagnoses:   1. Anemia - s/p transfused; now comfort measures  2. Hypokalemia - replaced  3. Acute encephalopathy  4. Metastatic prostate cancer - mets to bone; s/p radiation  5. History of lung adenocarcinoma - s/p b/l lobectomy  6. Chronic pain - chronic narcotic use  7. Anxiety  8. Urinary retention  9. Underweight - BMI 18.13; severe malnutrition, appreciate nutrition  10. COPD without acute exacerbation    Hospital Course: 74 yo M adm for severe anemia with initially hgb 4.6, hypokalemia 2.5. He was transfused but despite transfusion remained low. Hematology, GI initially consulted but he deteriorated prior to their evaluation. Palliative care was consulted and his mPOA opted for comfort measures. He is being transitioned to inpatient hospice. PCP: German Michele MD     Consults: Palliative Care    Significant Diagnostic Studies: CT HEAD WO CONT    Result Date: 1/3/2022  EXAM: CT HEAD WO CONT INDICATION: altered mental status COMPARISON: None. CONTRAST: None. TECHNIQUE: Unenhanced CT of the head was performed using 5 mm images. Brain and bone windows were generated. Coronal and sagittal reformats. CT dose reduction was achieved through use of a standardized protocol tailored for this examination and automatic exposure control for dose modulation. FINDINGS: There is slight prominence of the ventricles and sulci. There are slight changes small vessel disease periventricular white matter. No hemorrhage mass or acute infarction is identified. The bone windows demonstrate no abnormalities. The visualized portions of the paranasal sinuses and mastoid air cells are clear.  There is sclerosis in the right condylar head and neck     No acute intracranial abnormality identified     XR CHEST PORT    Result Date: 1/3/2022  INDICATION: Altered mental status. Portable AP view of the chest. Direct comparison made to prior chest x-ray dated Cardiomediastinal silhouette is within normal limits. Lungs are grossly clear. Sutures overlie the right hemithorax. There is mild blunting of the right costophrenic angle, likely representing pleural thickening. No significant pleural fluid is visualized. There is no pneumothorax. The osseous structures are diffusely demineralized, but intact. Grossly clear lungs. DUPLEX LOWER EXT VENOUS BILAT    Result Date: 1/4/2022  Bilateral lower extremity venous duplex negative for deep venous thrombosis or thrombophlebitis. Discharge Exam:  Physical Exam:    Gen:  Well-developed, in no acute distress  HEENT:  Eyes closed, dry mucous membranes  Neck:  Supple, without masses, thyroid non-tender  Resp:  No accessory muscle use, clear breath sounds without wheezes rales or rhonchi  Card:  No murmurs, normal S1, S2 without thrills, bruits or peripheral edema  Abd:  Soft, non-tender, non-distended, normoactive bowel sounds are present, no palpable organomegaly and no detectable hernias  Musc:  No cyanosis or clubbing  Skin:  No rashes or ulcers, skin turgor is good  Neuro:  Nonfocal, lethargic  Psych:  lethargic    Disposition: inpatient hospice  Discharge Condition: guarded, actively dying    Activity: Bedrest  Diet: Regular Diet  Wound Care: None needed    Follow-up with  Follow-up Information     Follow up With Specialties Details Why Contact Love Baker MD Nurse Practitioner   [address] [place] [address]  [phone]          Signed:  Kimberlyn Hansen DO  1/6/2022  11:56 AM    Time spent 25 minutes.

## 2022-01-06 NOTE — PROGRESS NOTES
Problem: Hospice Orientation  Goal: Demonstrate understanding of hospice philosophy, plan of care, and home hospice program  Description: The patient/family/caregiver will demonstrate understanding of hospice philosophy, plan of care and the home hospice program as evidenced by participation in meeting the patient's psychosocial, spiritual, medical, and physical needs inclusive of medical supplies/equipment focusing on symptoms. Outcome: Progressing Towards Goal     Problem: Potential for Alteration in Skin Integrity  Goal: Monitor skin for areas of alteration in skin integrity  Description: Patient/family/caregiver will demonstrate ability to care for patient's skin, monitor for areas of breakdown, and demonstrate methods to prevent breakdown during hospice care. Outcome: Progressing Towards Goal     Problem: Risk for Falls  Goal: Free of falls during inpatient stay  Description: Patient will be free of falls during inpatient stay. Outcome: Progressing Towards Goal     Problem: Pain  Goal: Assess satisfaction of level of comfort and symptom control  Outcome: Progressing Towards Goal  Goal: *Control of acute pain  Outcome: Progressing Towards Goal     Problem: Breathing Pattern - Ineffective  Goal: *Use of effective breathing techniques  Outcome: Progressing Towards Goal     Problem: Pressure Injury - Risk of  Goal: *Prevention of pressure injury  Outcome: Progressing Towards Goal  Note: Pressure Injury Interventions:                                            Problem: General Wound Care  Goal: *Non-infected wound: Improvement of existing wound, absence of infection, and maintenance of skin integrity  Outcome: Progressing Towards Goal  Goal: *Infected Wound: Prevention of further infection  Description: Infection control procedures (eg: clean dressings, clean gloves, hand washing, precautions to isolate wound from contamination, sterile instruments used for wound debridement) should be implemented.   Outcome: Progressing Towards Goal  Goal: Interventions  Outcome: Progressing Towards Goal     Problem: Infection - Risk of, Urinary Catheter-Associated Urinary Tract Infection  Goal: *Absence of infection signs and symptoms  Outcome: Progressing Towards Goal     Problem: End of Life Process  Goal: Demonstrate understanding of end of life processes  Description: Patient/caregiver will understand end of life processes.   Outcome: Progressing Towards Goal     Problem: Dyspnea Due to End of Life  Goal: Demonstrate understanding of and ability to manage respiratory symptoms at end of life  Outcome: Progressing Towards Goal     Problem: Imminent Death  Goal: Collaborate with patient/family/caregiver/interdisciplinary team to minimize and manage end of life symptoms  Outcome: Progressing Towards Goal     Problem: Discharge Planning  Goal: *Participates in discharge planning  Outcome: Progressing Towards Goal

## 2022-01-06 NOTE — PROGRESS NOTES
Bedside shift change report given to [id number]rell  (oncoming nurse) by José Manuel Rasmussen  (offgoing nurse). Report included the following information SBAR, Kardex, ED Summary, MAR, Recent Results and Med Rec Status.

## 2022-01-06 NOTE — PROGRESS NOTES
Bedside shift change report given to Marin Grady RN (oncoming nurse) by Glenroy Olvera RN (offgoing nurse). Report included the following information SBAR, Kardex, Intake/Output, MAR and Recent Results.

## 2022-01-06 NOTE — PROGRESS NOTES
paged for patient's death. The patient is Mr. Bharti Malagon on [facility] [facility] [address]-512.  consulted with attending nurse and no family or visitors present or coming. Advised nurse to contact [facility] for any further referrals.     Chaplain Zeus Terrazas M.Div.  Oxana Fu ([id number])

## 2022-01-06 NOTE — PROGRESS NOTES
Time of death is 1247 pm. Confirmed by no spontaneous respirations, no apical pulse for 1 full minute, and fixed pupils.  and hospice nurse was contacted. Hospice nurse will be here in an hour.

## 2022-01-07 NOTE — HOSPICE
114 Maryanne Ash Bereavement/Condolence Call: This MSW called pts cousin, Ms. Weeks to offer condolences and support. MSW left  and offered [facility] information for ongoing support.        Dariela Diez    [phone]

## 2022-01-08 LAB
BACTERIA SPEC CULT: NORMAL
SERVICE CMNT-IMP: NORMAL
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
## 2024-10-26 NOTE — ED NOTES
0209 Pt medicated for pain prior rolling and cleaning him up of large stool. Stool is now water with brown tinge. Pt moaning during cleaning and reposition. 0229 pt medicated with ativan. Restless and pulling at gown. 0248 pt medicated. resp rate 36. Moaning. Will reassess within 30 minutes    0403 pt medicated prior to cleaning pt on large loose stool. Pt moans with any wiping and pushes against staff when turning    030 70 25 13 pt medicated prior to cleaning and reposition of loose watery stool with mucus. 0559  Pt appears restless. Moving in bed. Medicated     0642  Pt began to moan while checking brief for stool.   Pt medicated
2130 Report from [facility]  Bedside and Verbal shift change report given to Kiana Frederick RN  (oncoming nurse) by Vanessa Hooker  (offgoing nurse). Report included the following information SBAR, Kardex, [facility] and Cardiac Rhythm NSR     2144 Pt medicated with morphine for complaint of pain. Pt arouses easily. Pt incontinent of large amount of loose stool. 2146 Pt cleaned of large amount to loose stool. During cleaning patient continued to pass both formed and liquid brown stool. Pt noted to have multiple wounds to sacrum and left hip. Clean dressing reapplied. 2218 pt pulling at leads. Pulling off gown. Pt appears uncomfortable and agitated. Medicated with PRN ativan    2242 pt evaluated. Arouses to presence in room and to name. Pt complains of pain. Patient medicated with morphine prn.    2335 rounded on patient. Attempted to reposition patient. Pt complained of pain. Medicated with morphine    2345 pt cleaned up of large loose stool. Dressing removed. Pt grimaced from discomfort. Pt fighting staff and attempting to bite while cleaning stool up. 1/5/2022    0002 pt medicated with ativan for continued restlessness with trying to position for comfort    0010 hospitalist perfect serve to advise of patients condition. Awaiting return call    0040 spoke with hospitalist.  Continue medications for comfort. No further orders.
Critical lab received: Hgb 4.6, Hct 16.1     Notified MD and primary RN
Patient care transferred to Atilio Fragoso RN. Report accepted.
Patient cleansed of BM and new brief with chucks applied.
Pt appears more comfortable. Able to lie down instead of sitting up. Pt sores on buttocks left uncovered due to having to remove with each stool and it creating additional issues with skin.
Activity as tolerated